# Patient Record
Sex: MALE | Race: OTHER | HISPANIC OR LATINO
[De-identification: names, ages, dates, MRNs, and addresses within clinical notes are randomized per-mention and may not be internally consistent; named-entity substitution may affect disease eponyms.]

---

## 2021-11-10 ENCOUNTER — TRANSCRIPTION ENCOUNTER (OUTPATIENT)
Age: 51
End: 2021-11-10

## 2021-11-11 ENCOUNTER — INPATIENT (INPATIENT)
Facility: HOSPITAL | Age: 51
LOS: 4 days | Discharge: HOME | DRG: 330 | End: 2021-11-16
Attending: SURGERY | Admitting: SURGERY
Payer: COMMERCIAL

## 2021-11-11 VITALS
RESPIRATION RATE: 18 BRPM | OXYGEN SATURATION: 95 % | SYSTOLIC BLOOD PRESSURE: 158 MMHG | HEART RATE: 71 BPM | TEMPERATURE: 98 F | DIASTOLIC BLOOD PRESSURE: 92 MMHG

## 2021-11-11 DIAGNOSIS — T14.90XA INJURY, UNSPECIFIED, INITIAL ENCOUNTER: ICD-10-CM

## 2021-11-11 DIAGNOSIS — K63.1 PERFORATION OF INTESTINE (NONTRAUMATIC): ICD-10-CM

## 2021-11-11 LAB
ALBUMIN SERPL ELPH-MCNC: 4.1 G/DL — SIGNIFICANT CHANGE UP (ref 3.3–5)
ALP SERPL-CCNC: 83 U/L — SIGNIFICANT CHANGE UP (ref 40–120)
ALT FLD-CCNC: 31 U/L — SIGNIFICANT CHANGE UP (ref 10–45)
ANION GAP SERPL CALC-SCNC: 14 MMOL/L — SIGNIFICANT CHANGE UP (ref 5–17)
ANION GAP SERPL CALC-SCNC: 15 MMOL/L — SIGNIFICANT CHANGE UP (ref 5–17)
APTT BLD: 23.9 SEC — LOW (ref 27.5–35.5)
APTT BLD: 24 SEC — LOW (ref 27.5–35.5)
AST SERPL-CCNC: 31 U/L — SIGNIFICANT CHANGE UP (ref 10–40)
BASOPHILS # BLD AUTO: 0.02 K/UL — SIGNIFICANT CHANGE UP (ref 0–0.2)
BASOPHILS NFR BLD AUTO: 0.2 % — SIGNIFICANT CHANGE UP (ref 0–2)
BILIRUB SERPL-MCNC: 0.7 MG/DL — SIGNIFICANT CHANGE UP (ref 0.2–1.2)
BLD GP AB SCN SERPL QL: NEGATIVE — SIGNIFICANT CHANGE UP
BUN SERPL-MCNC: 17 MG/DL — SIGNIFICANT CHANGE UP (ref 7–23)
BUN SERPL-MCNC: 19 MG/DL — SIGNIFICANT CHANGE UP (ref 7–23)
CALCIUM SERPL-MCNC: 7.7 MG/DL — LOW (ref 8.4–10.5)
CALCIUM SERPL-MCNC: 8.7 MG/DL — SIGNIFICANT CHANGE UP (ref 8.4–10.5)
CHLORIDE SERPL-SCNC: 102 MMOL/L — SIGNIFICANT CHANGE UP (ref 96–108)
CHLORIDE SERPL-SCNC: 105 MMOL/L — SIGNIFICANT CHANGE UP (ref 96–108)
CO2 SERPL-SCNC: 19 MMOL/L — LOW (ref 22–31)
CO2 SERPL-SCNC: 22 MMOL/L — SIGNIFICANT CHANGE UP (ref 22–31)
CREAT SERPL-MCNC: 0.93 MG/DL — SIGNIFICANT CHANGE UP (ref 0.5–1.3)
CREAT SERPL-MCNC: 1.15 MG/DL — SIGNIFICANT CHANGE UP (ref 0.5–1.3)
EOSINOPHIL # BLD AUTO: 0.03 K/UL — SIGNIFICANT CHANGE UP (ref 0–0.5)
EOSINOPHIL NFR BLD AUTO: 0.4 % — SIGNIFICANT CHANGE UP (ref 0–6)
GAS PNL BLDA: SIGNIFICANT CHANGE UP
GLUCOSE SERPL-MCNC: 153 MG/DL — HIGH (ref 70–99)
GLUCOSE SERPL-MCNC: 183 MG/DL — HIGH (ref 70–99)
HCT VFR BLD CALC: 42 % — SIGNIFICANT CHANGE UP (ref 39–50)
HCT VFR BLD CALC: 42.5 % — SIGNIFICANT CHANGE UP (ref 39–50)
HCT VFR BLD CALC: 43.3 % — SIGNIFICANT CHANGE UP (ref 39–50)
HGB BLD-MCNC: 13.9 G/DL — SIGNIFICANT CHANGE UP (ref 13–17)
HGB BLD-MCNC: 14.2 G/DL — SIGNIFICANT CHANGE UP (ref 13–17)
HGB BLD-MCNC: 14.3 G/DL — SIGNIFICANT CHANGE UP (ref 13–17)
IMM GRANULOCYTES NFR BLD AUTO: 0.4 % — SIGNIFICANT CHANGE UP (ref 0–1.5)
INR BLD: 0.97 RATIO — SIGNIFICANT CHANGE UP (ref 0.88–1.16)
INR BLD: 1.01 RATIO — SIGNIFICANT CHANGE UP (ref 0.88–1.16)
LIDOCAIN IGE QN: 41 U/L — SIGNIFICANT CHANGE UP (ref 7–60)
LYMPHOCYTES # BLD AUTO: 2.41 K/UL — SIGNIFICANT CHANGE UP (ref 1–3.3)
LYMPHOCYTES # BLD AUTO: 28.8 % — SIGNIFICANT CHANGE UP (ref 13–44)
MAGNESIUM SERPL-MCNC: 2 MG/DL — SIGNIFICANT CHANGE UP (ref 1.6–2.6)
MCHC RBC-ENTMCNC: 28.3 PG — SIGNIFICANT CHANGE UP (ref 27–34)
MCHC RBC-ENTMCNC: 28.5 PG — SIGNIFICANT CHANGE UP (ref 27–34)
MCHC RBC-ENTMCNC: 29 PG — SIGNIFICANT CHANGE UP (ref 27–34)
MCHC RBC-ENTMCNC: 33 GM/DL — SIGNIFICANT CHANGE UP (ref 32–36)
MCHC RBC-ENTMCNC: 33.1 GM/DL — SIGNIFICANT CHANGE UP (ref 32–36)
MCHC RBC-ENTMCNC: 33.4 GM/DL — SIGNIFICANT CHANGE UP (ref 32–36)
MCV RBC AUTO: 85.6 FL — SIGNIFICANT CHANGE UP (ref 80–100)
MCV RBC AUTO: 86.1 FL — SIGNIFICANT CHANGE UP (ref 80–100)
MCV RBC AUTO: 86.7 FL — SIGNIFICANT CHANGE UP (ref 80–100)
MONOCYTES # BLD AUTO: 0.53 K/UL — SIGNIFICANT CHANGE UP (ref 0–0.9)
MONOCYTES NFR BLD AUTO: 6.3 % — SIGNIFICANT CHANGE UP (ref 2–14)
NEUTROPHILS # BLD AUTO: 5.36 K/UL — SIGNIFICANT CHANGE UP (ref 1.8–7.4)
NEUTROPHILS NFR BLD AUTO: 63.9 % — SIGNIFICANT CHANGE UP (ref 43–77)
NRBC # BLD: 0 /100 WBCS — SIGNIFICANT CHANGE UP (ref 0–0)
PHOSPHATE SERPL-MCNC: 3.8 MG/DL — SIGNIFICANT CHANGE UP (ref 2.5–4.5)
PLATELET # BLD AUTO: 224 K/UL — SIGNIFICANT CHANGE UP (ref 150–400)
PLATELET # BLD AUTO: 228 K/UL — SIGNIFICANT CHANGE UP (ref 150–400)
PLATELET # BLD AUTO: 239 K/UL — SIGNIFICANT CHANGE UP (ref 150–400)
POTASSIUM SERPL-MCNC: 3.2 MMOL/L — LOW (ref 3.5–5.3)
POTASSIUM SERPL-MCNC: 3.9 MMOL/L — SIGNIFICANT CHANGE UP (ref 3.5–5.3)
POTASSIUM SERPL-SCNC: 3.2 MMOL/L — LOW (ref 3.5–5.3)
POTASSIUM SERPL-SCNC: 3.9 MMOL/L — SIGNIFICANT CHANGE UP (ref 3.5–5.3)
PROT SERPL-MCNC: 6.9 G/DL — SIGNIFICANT CHANGE UP (ref 6–8.3)
PROTHROM AB SERPL-ACNC: 11.7 SEC — SIGNIFICANT CHANGE UP (ref 10.6–13.6)
PROTHROM AB SERPL-ACNC: 12.1 SEC — SIGNIFICANT CHANGE UP (ref 10.6–13.6)
RBC # BLD: 4.88 M/UL — SIGNIFICANT CHANGE UP (ref 4.2–5.8)
RBC # BLD: 4.9 M/UL — SIGNIFICANT CHANGE UP (ref 4.2–5.8)
RBC # BLD: 5.06 M/UL — SIGNIFICANT CHANGE UP (ref 4.2–5.8)
RBC # FLD: 12.3 % — SIGNIFICANT CHANGE UP (ref 10.3–14.5)
RBC # FLD: 12.5 % — SIGNIFICANT CHANGE UP (ref 10.3–14.5)
RBC # FLD: 12.6 % — SIGNIFICANT CHANGE UP (ref 10.3–14.5)
RH IG SCN BLD-IMP: POSITIVE — SIGNIFICANT CHANGE UP
SARS-COV-2 RNA SPEC QL NAA+PROBE: SIGNIFICANT CHANGE UP
SARS-COV-2 RNA SPEC QL NAA+PROBE: SIGNIFICANT CHANGE UP
SODIUM SERPL-SCNC: 138 MMOL/L — SIGNIFICANT CHANGE UP (ref 135–145)
SODIUM SERPL-SCNC: 139 MMOL/L — SIGNIFICANT CHANGE UP (ref 135–145)
WBC # BLD: 2.54 K/UL — LOW (ref 3.8–10.5)
WBC # BLD: 2.97 K/UL — LOW (ref 3.8–10.5)
WBC # BLD: 8.38 K/UL — SIGNIFICANT CHANGE UP (ref 3.8–10.5)
WBC # FLD AUTO: 2.54 K/UL — LOW (ref 3.8–10.5)
WBC # FLD AUTO: 2.97 K/UL — LOW (ref 3.8–10.5)
WBC # FLD AUTO: 8.38 K/UL — SIGNIFICANT CHANGE UP (ref 3.8–10.5)

## 2021-11-11 PROCEDURE — 99291 CRITICAL CARE FIRST HOUR: CPT

## 2021-11-11 PROCEDURE — 74177 CT ABD & PELVIS W/CONTRAST: CPT | Mod: 26,MA

## 2021-11-11 PROCEDURE — 73590 X-RAY EXAM OF LOWER LEG: CPT | Mod: 26,RT

## 2021-11-11 PROCEDURE — 44605 REPAIR OF BOWEL LESION: CPT

## 2021-11-11 PROCEDURE — 72170 X-RAY EXAM OF PELVIS: CPT | Mod: 26

## 2021-11-11 PROCEDURE — 71260 CT THORAX DX C+: CPT | Mod: 26,MA

## 2021-11-11 PROCEDURE — 44603 SUTURE SMALL INTESTINE: CPT

## 2021-11-11 PROCEDURE — 71045 X-RAY EXAM CHEST 1 VIEW: CPT | Mod: 26

## 2021-11-11 RX ORDER — CALCIUM GLUCONATE 100 MG/ML
1 VIAL (ML) INTRAVENOUS ONCE
Refills: 0 | Status: COMPLETED | OUTPATIENT
Start: 2021-11-11 | End: 2021-11-11

## 2021-11-11 RX ORDER — HYDROMORPHONE HYDROCHLORIDE 2 MG/ML
0.5 INJECTION INTRAMUSCULAR; INTRAVENOUS; SUBCUTANEOUS
Refills: 0 | Status: DISCONTINUED | OUTPATIENT
Start: 2021-11-11 | End: 2021-11-12

## 2021-11-11 RX ORDER — ACETAMINOPHEN 500 MG
1000 TABLET ORAL EVERY 6 HOURS
Refills: 0 | Status: COMPLETED | OUTPATIENT
Start: 2021-11-11 | End: 2021-11-12

## 2021-11-11 RX ORDER — CHLORHEXIDINE GLUCONATE 213 G/1000ML
15 SOLUTION TOPICAL EVERY 12 HOURS
Refills: 0 | Status: DISCONTINUED | OUTPATIENT
Start: 2021-11-11 | End: 2021-11-12

## 2021-11-11 RX ORDER — MORPHINE SULFATE 50 MG/1
6 CAPSULE, EXTENDED RELEASE ORAL ONCE
Refills: 0 | Status: DISCONTINUED | OUTPATIENT
Start: 2021-11-11 | End: 2021-11-11

## 2021-11-11 RX ORDER — SODIUM CHLORIDE 9 MG/ML
1000 INJECTION, SOLUTION INTRAVENOUS
Refills: 0 | Status: DISCONTINUED | OUTPATIENT
Start: 2021-11-11 | End: 2021-11-11

## 2021-11-11 RX ORDER — ONDANSETRON 8 MG/1
8 TABLET, FILM COATED ORAL ONCE
Refills: 0 | Status: COMPLETED | OUTPATIENT
Start: 2021-11-11 | End: 2021-11-11

## 2021-11-11 RX ORDER — SODIUM CHLORIDE 9 MG/ML
1000 INJECTION, SOLUTION INTRAVENOUS
Refills: 0 | Status: DISCONTINUED | OUTPATIENT
Start: 2021-11-11 | End: 2021-11-12

## 2021-11-11 RX ORDER — TETANUS TOXOID, REDUCED DIPHTHERIA TOXOID AND ACELLULAR PERTUSSIS VACCINE, ADSORBED 5; 2.5; 8; 8; 2.5 [IU]/.5ML; [IU]/.5ML; UG/.5ML; UG/.5ML; UG/.5ML
0.5 SUSPENSION INTRAMUSCULAR ONCE
Refills: 0 | Status: COMPLETED | OUTPATIENT
Start: 2021-11-11 | End: 2021-11-11

## 2021-11-11 RX ORDER — HYDROMORPHONE HYDROCHLORIDE 2 MG/ML
0.5 INJECTION INTRAMUSCULAR; INTRAVENOUS; SUBCUTANEOUS ONCE
Refills: 0 | Status: DISCONTINUED | OUTPATIENT
Start: 2021-11-11 | End: 2021-11-11

## 2021-11-11 RX ADMIN — HYDROMORPHONE HYDROCHLORIDE 0.5 MILLIGRAM(S): 2 INJECTION INTRAMUSCULAR; INTRAVENOUS; SUBCUTANEOUS at 18:00

## 2021-11-11 RX ADMIN — HYDROMORPHONE HYDROCHLORIDE 0.5 MILLIGRAM(S): 2 INJECTION INTRAMUSCULAR; INTRAVENOUS; SUBCUTANEOUS at 18:45

## 2021-11-11 RX ADMIN — MORPHINE SULFATE 6 MILLIGRAM(S): 50 CAPSULE, EXTENDED RELEASE ORAL at 13:38

## 2021-11-11 RX ADMIN — Medication 100 GRAM(S): at 19:24

## 2021-11-11 RX ADMIN — ONDANSETRON 8 MILLIGRAM(S): 8 TABLET, FILM COATED ORAL at 13:38

## 2021-11-11 RX ADMIN — Medication 400 MILLIGRAM(S): at 22:00

## 2021-11-11 RX ADMIN — Medication 100 GRAM(S): at 18:55

## 2021-11-11 RX ADMIN — SODIUM CHLORIDE 100 MILLILITER(S): 9 INJECTION, SOLUTION INTRAVENOUS at 18:48

## 2021-11-11 RX ADMIN — Medication 1000 MILLIGRAM(S): at 22:15

## 2021-11-11 RX ADMIN — HYDROMORPHONE HYDROCHLORIDE 0.5 MILLIGRAM(S): 2 INJECTION INTRAMUSCULAR; INTRAVENOUS; SUBCUTANEOUS at 18:15
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
## 2021-11-11 NOTE — CHART NOTE - NSCHARTNOTEFT_GEN_A_CORE
Surgery Post op Note    Procedure: Exploratory laparotomy, Repair of enterotomy,Repair of colotomy     SUBJECTIVE: Pt seen and examined at bedside several hours after surgery.   SOB:  [ ] YES [ *] NO  Chest Discomfort: [ ] YES [* ] NO    Nausea: [ ] YES [ *] NO           Vomiting: [ ] YES [ *] NO  Flatus: [ ] YES [* ] NO             Bowel Movement: [ ] YES [ *] NO  Void: butterfield        Pain Control Adequate: [* ] YES [ ] NO      Physical exam  General Appearance: Appears well, NAD  Respiratory: No labored breathing  CV: Pulse regularly present  Abdomen: Soft, distended, nontender, dressing clear    Vital Signs Last 24 Hrs  T(C): 37 (11 Nov 2021 20:00), Max: 37 (11 Nov 2021 20:00)  T(F): 98.6 (11 Nov 2021 20:00), Max: 98.6 (11 Nov 2021 20:00)  HR: 91 (11 Nov 2021 22:15) (71 - 97)  BP: 130/83 (11 Nov 2021 22:00) (130/83 - 161/82)  BP(mean): 101 (11 Nov 2021 22:00) (98 - 114)  RR: 16 (11 Nov 2021 22:15) (14 - 24)  SpO2: 95% (11 Nov 2021 22:15) (94% - 100%)  I&O's Summary    11 Nov 2021 07:01  -  11 Nov 2021 23:04  --------------------------------------------------------  IN: 300 mL / OUT: 525 mL / NET: -225 mL      I&O's Detail    11 Nov 2021 07:01  -  11 Nov 2021 23:04  --------------------------------------------------------  IN:    IV PiggyBack: 100 mL    Lactated Ringers: 200 mL  Total IN: 300 mL    OUT:    Indwelling Catheter - Urethral (mL): 125 mL    Nasogastric/Oral tube (mL): 400 mL  Total OUT: 525 mL    Total NET: -225 mL          MEDICATIONS  (STANDING):  acetaminophen   IVPB .. 1000 milliGRAM(s) IV Intermittent every 6 hours  chlorhexidine 0.12% Liquid 15 milliLiter(s) Oral Mucosa every 12 hours  diphtheria/tetanus/pertussis (acellular) Vaccine (ADAcel) 0.5 milliLiter(s) IntraMuscular once  lactated ringers. 1000 milliLiter(s) (100 mL/Hr) IV Continuous <Continuous>    MEDICATIONS  (PRN):  HYDROmorphone  Injectable 0.5 milliGRAM(s) IV Push every 10 minutes PRN Moderate Pain (4 - 6)      LABS:                        14.3   2.97  )-----------( 224      ( 11 Nov 2021 22:05 )             43.3     11-11    139  |  105  |  17  ----------------------------<  183<H>  3.9   |  19<L>  |  0.93    Ca    7.7<L>      11 Nov 2021 18:40  Phos  3.8     11-11  Mg     2.0     11-11    TPro  6.9  /  Alb  4.1  /  TBili  0.7  /  DBili  x   /  AST  31  /  ALT  31  /  AlkPhos  83  11-11    PT/INR - ( 11 Nov 2021 18:40 )   PT: 12.1 sec;   INR: 1.01 ratio         PTT - ( 11 Nov 2021 18:40 )  PTT:24.0 sec        Plan:  51 M s/p Exploratory laparotomy, Repair of enterotomy, Repair of colotomy.  - NG tube, NPO, IVF  - pain control  - serial abdominal examination  - f/u vitals  - f/u am labs    ACS  [id number]

## 2021-11-11 NOTE — ED PROVIDER NOTE - PHYSICAL EXAMINATION
gen: appears uncomfortable  Mentation: AAO x 3  psych: mood appropriate  ENT: airway patent  Eyes: conjunctivae clear bilaterally  Cardio: RRR, no m/r/g  Resp: normal BS b/l  GI: distended, diffusely tender  Neuro: sensation and motor function intact  Skin: +4 cm superficial laceration of R ant shin  MSK: normal movement of all extremities, no gross bony deformities, no spinal tenderness  Lymph/Vasc: no LE edema

## 2021-11-11 NOTE — BRIEF OPERATIVE NOTE - NSICDXBRIEFPROCEDURE_GEN_ALL_CORE_FT
PROCEDURES:  Exploratory laparotomy 11-Nov-2021 18:06:46  Clif Galvez  Repair of enterotomy 11-Nov-2021 18:06:55  Clif Galvez  Repair of colotomy 11-Nov-2021 18:07:14  Clif Galvez

## 2021-11-11 NOTE — H&P ADULT - NSHPLABSRESULTS_GEN_ALL_CORE
Vital Signs Last 24 Hrs  T(C): 36.7 (11 Nov 2021 13:21), Max: 36.7 (11 Nov 2021 13:21)  T(F): 98 (11 Nov 2021 13:21), Max: 98 (11 Nov 2021 13:21)  HR: 88 (11 Nov 2021 13:30) (71 - 88)  BP: 157/76 (11 Nov 2021 13:30) (157/76 - 158/92)  BP(mean): --  RR: 24 (11 Nov 2021 13:30) (18 - 24)  SpO2: 96% (11 Nov 2021 13:30) (95% - 96%)    LABS:                          13.9   8.38  )-----------( 228      ( 11 Nov 2021 13:58 )             42.0     11-11    138  |  102  |  19  ----------------------------<  153<H>  3.2<L>   |  22  |  1.15    Ca    8.7      11 Nov 2021 13:58    TPro  6.9  /  Alb  4.1  /  TBili  0.7  /  DBili  x   /  AST  31  /  ALT  31  /  AlkPhos  83  11-11    PT/INR - ( 11 Nov 2021 13:58 )   PT: 11.7 sec;   INR: 0.97 ratio         PTT - ( 11 Nov 2021 13:58 )  PTT:23.9 sec      < from: CT Chest w/ IV Cont (11.11.21 @ 13:51) >      EXAM:  CT ABDOMEN AND PELVIS IC                          EXAM:  CT CHEST IC                            PROCEDURE DATE:  11/11/2021            INTERPRETATION:  CLINICAL INFORMATION: Trauma code.    COMPARISON: None.    CONTRAST/COMPLICATIONS:  IV Contrast: Omnipaque 350  90 cc administered   10 cc discarded  Oral Contrast: NONE  Complications: None reported at time of study completion    PROCEDURE:  CT of the Chest, Abdomen and Pelvis was performed.  Sagittal and coronal reformats were performed.    IMPRESSION:  Mild pneumoperitoneum and hemoperitoneum. Focal wall thickening of the transverse colon with adjacent extraluminal air raising suspicion for bowel injury/perforation at this site. Right lower quadrant mesenteric hematoma with active bleeding adjacent to the proximal ascending colon.    < from: Xray Tibia + Fibula 2 Views, Right (11.11.21 @ 14:22) >    IMPRESSION:    There is cortical irregularity along the lateral tibial plateau, which may represent a lateral tibial plateau fracture. Recommend further evaluation with right knee radiographs.    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    --- End of Report ---    < end of copied text >

## 2021-11-11 NOTE — PRE-ANESTHESIA EVALUATION ADULT - NSANTHADDINFOFT_GEN_ALL_CORE
r+b discussed with patient; consent implied, patient was first seen in OR2 brought emergently from ED. 2 physician consent  plan for gurinder rain aline

## 2021-11-11 NOTE — ED ADULT NURSE NOTE - NSIMPLEMENTINTERV_GEN_ALL_ED
Implemented All Fall Risk Interventions:  [place] to call system. Call bell, personal items and telephone within reach. Instruct patient to call for assistance. Room bathroom lighting operational. Non-slip footwear when patient is off stretcher. Physically safe environment: no spills, clutter or unnecessary equipment. Stretcher in lowest position, wheels locked, appropriate side rails in place. Provide visual cue, wrist band, yellow gown, etc. Monitor gait and stability. Monitor for mental status changes and reorient to person, place, and time. Review medications for side effects contributing to fall risk. Reinforce activity limits and safety measures with patient and family.
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
## 2021-11-11 NOTE — H&P ADULT - NSHPPHYSICALEXAM_GEN_ALL_CORE
PHYSICAL EXAM:    Constitutional: NAD, lying in bed  Eyes: anicteric  ENMT: Normocephalic, atraumatic  Neck: trachea midline, no TTP, no stepoffs, moves without pain  Respiratory: nonlabored, equal chest rise  Cardiovascular: RRR  Gastrointestinal: abdomen significantly distended, firm to palpation, TTP in all four quadrants, greatest in RUQ  Extremities: RLE with abrasion and edema over right shin, remaining extremities WWP, SILT grossly intact, no gross deformities  Vascular: 2+ DP/PT, b/l radial pulses  Back: no TTP, no injuries, no stepoffs along spine  Neurological: awake and alert, moves all 4 extremities spontaneously
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
## 2021-11-11 NOTE — AIRWAY REMOVAL NOTE  ADULT & PEDS - ARTIFICAL AIRWAY REMOVAL COMMENTS
@ 1745 Pt had 1 twitch on TOF. Given 400mg of suggamadex , @ 1750 patient with 4 twitches on TOF. Propofol discontinued@ 1750. Pateint placed on CPAP 5 PS 5 40% @ 1800. @ 1805 Pt's RR 16 Pulling tidal volumes of 450-900. Pt followuing commands appraopratley with ,. Patient afirmed he was in pain and received 0.5 mg IV of Dilaudid. Dr Rodriguez at bedside. Patient extubated after oropharynx suctioned out without incident. Post extubation abg sent

## 2021-11-11 NOTE — ED ADULT TRIAGE NOTE - PRO INTERPRETER NEED 2
What Type Of Note Output Would You Prefer (Optional)?: Standard Output
How Severe Is Your Rash?: mild
Is This A New Presentation, Or A Follow-Up?: Rash
English

## 2021-11-11 NOTE — BRIEF OPERATIVE NOTE - OPERATION/FINDINGS
Exploratory laparotomy. Mix of blood & succus on entrance to abdomen. No significant bleeding noted after suctioning. Abdomen packed. Small bowel run with identification of 2 separate enterotomies, each about 5mm in diameter - repaired primarily in 2 layers. Colon examined and 2cm full thickness injury to transverse colon identified - repaired primarily in 2 layers. Remainder of abdomen explored - no additional injuries identified. Abdomen washed out and fascia closed with #1 maxon running. Skin stapled with eduardo.

## 2021-11-11 NOTE — ED PROVIDER NOTE - NS ED ROS FT
CONSTITUTIONAL: No fevers, no chills, no lightheadedness, no dizziness  Eyes: no visual changes  Ears: no ear drainage, no ear pain  Nose: no nasal congestion  Mouth/Throat: no sore throat  CV: No chest pain, no palpitations  PULM: No SOB, no cough  GI: +n/v, abd pain  : no dysuria, no hematuria  SKIN: +abrasion of RLE  NEURO: no headache, no focal weakness or numbness  LYMPH/VASC: no LE swelling

## 2021-11-12 LAB
ANION GAP SERPL CALC-SCNC: 14 MMOL/L — SIGNIFICANT CHANGE UP (ref 5–17)
BUN SERPL-MCNC: 23 MG/DL — SIGNIFICANT CHANGE UP (ref 7–23)
CALCIUM SERPL-MCNC: 8.5 MG/DL — SIGNIFICANT CHANGE UP (ref 8.4–10.5)
CHLORIDE SERPL-SCNC: 102 MMOL/L — SIGNIFICANT CHANGE UP (ref 96–108)
CO2 SERPL-SCNC: 20 MMOL/L — LOW (ref 22–31)
COVID-19 NUCLEOCAPSID GAM AB INTERP: NEGATIVE — SIGNIFICANT CHANGE UP
COVID-19 NUCLEOCAPSID TOTAL GAM ANTIBODY RESULT: 0.07 INDEX — SIGNIFICANT CHANGE UP
COVID-19 SPIKE DOMAIN AB INTERP: POSITIVE
COVID-19 SPIKE DOMAIN ANTIBODY RESULT: >250 U/ML — HIGH
CREAT SERPL-MCNC: 1.25 MG/DL — SIGNIFICANT CHANGE UP (ref 0.5–1.3)
GLUCOSE SERPL-MCNC: 163 MG/DL — HIGH (ref 70–99)
HCT VFR BLD CALC: 41.8 % — SIGNIFICANT CHANGE UP (ref 39–50)
HGB BLD-MCNC: 13.9 G/DL — SIGNIFICANT CHANGE UP (ref 13–17)
MAGNESIUM SERPL-MCNC: 1.8 MG/DL — SIGNIFICANT CHANGE UP (ref 1.6–2.6)
MCHC RBC-ENTMCNC: 28.8 PG — SIGNIFICANT CHANGE UP (ref 27–34)
MCHC RBC-ENTMCNC: 33.3 GM/DL — SIGNIFICANT CHANGE UP (ref 32–36)
MCV RBC AUTO: 86.7 FL — SIGNIFICANT CHANGE UP (ref 80–100)
NRBC # BLD: 0 /100 WBCS — SIGNIFICANT CHANGE UP (ref 0–0)
PHOSPHATE SERPL-MCNC: 3.4 MG/DL — SIGNIFICANT CHANGE UP (ref 2.5–4.5)
PLATELET # BLD AUTO: 211 K/UL — SIGNIFICANT CHANGE UP (ref 150–400)
POTASSIUM SERPL-MCNC: 4.1 MMOL/L — SIGNIFICANT CHANGE UP (ref 3.5–5.3)
POTASSIUM SERPL-SCNC: 4.1 MMOL/L — SIGNIFICANT CHANGE UP (ref 3.5–5.3)
RBC # BLD: 4.82 M/UL — SIGNIFICANT CHANGE UP (ref 4.2–5.8)
RBC # FLD: 12.8 % — SIGNIFICANT CHANGE UP (ref 10.3–14.5)
SARS-COV-2 IGG+IGM SERPL QL IA: 0.07 INDEX — SIGNIFICANT CHANGE UP
SARS-COV-2 IGG+IGM SERPL QL IA: >250 U/ML — HIGH
SARS-COV-2 IGG+IGM SERPL QL IA: NEGATIVE — SIGNIFICANT CHANGE UP
SARS-COV-2 IGG+IGM SERPL QL IA: POSITIVE
SODIUM SERPL-SCNC: 136 MMOL/L — SIGNIFICANT CHANGE UP (ref 135–145)
WBC # BLD: 5.76 K/UL — SIGNIFICANT CHANGE UP (ref 3.8–10.5)
WBC # FLD AUTO: 5.76 K/UL — SIGNIFICANT CHANGE UP (ref 3.8–10.5)

## 2021-11-12 RX ORDER — ENOXAPARIN SODIUM 100 MG/ML
40 INJECTION SUBCUTANEOUS EVERY 24 HOURS
Refills: 0 | Status: DISCONTINUED | OUTPATIENT
Start: 2021-11-12 | End: 2021-11-16

## 2021-11-12 RX ORDER — MAGNESIUM SULFATE 500 MG/ML
2 VIAL (ML) INJECTION ONCE
Refills: 0 | Status: COMPLETED | OUTPATIENT
Start: 2021-11-12 | End: 2021-11-12

## 2021-11-12 RX ORDER — INFLUENZA VIRUS VACCINE 15; 15; 15; 15 UG/.5ML; UG/.5ML; UG/.5ML; UG/.5ML
0.5 SUSPENSION INTRAMUSCULAR ONCE
Refills: 0 | Status: DISCONTINUED | OUTPATIENT
Start: 2021-11-12 | End: 2021-11-16

## 2021-11-12 RX ORDER — ACETAMINOPHEN 500 MG
1000 TABLET ORAL EVERY 6 HOURS
Refills: 0 | Status: COMPLETED | OUTPATIENT
Start: 2021-11-12 | End: 2021-11-13

## 2021-11-12 RX ORDER — HYDROMORPHONE HYDROCHLORIDE 2 MG/ML
0.5 INJECTION INTRAMUSCULAR; INTRAVENOUS; SUBCUTANEOUS EVERY 4 HOURS
Refills: 0 | Status: DISCONTINUED | OUTPATIENT
Start: 2021-11-12 | End: 2021-11-14

## 2021-11-12 RX ORDER — KETOROLAC TROMETHAMINE 30 MG/ML
15 SYRINGE (ML) INJECTION EVERY 8 HOURS
Refills: 0 | Status: DISCONTINUED | OUTPATIENT
Start: 2021-11-12 | End: 2021-11-14

## 2021-11-12 RX ORDER — DEXTROSE MONOHYDRATE, SODIUM CHLORIDE, AND POTASSIUM CHLORIDE 50; .745; 4.5 G/1000ML; G/1000ML; G/1000ML
1000 INJECTION, SOLUTION INTRAVENOUS
Refills: 0 | Status: DISCONTINUED | OUTPATIENT
Start: 2021-11-12 | End: 2021-11-15

## 2021-11-12 RX ADMIN — Medication 400 MILLIGRAM(S): at 20:30

## 2021-11-12 RX ADMIN — Medication 1000 MILLIGRAM(S): at 11:00

## 2021-11-12 RX ADMIN — Medication 1000 MILLIGRAM(S): at 21:00

## 2021-11-12 RX ADMIN — Medication 50 GRAM(S): at 10:34

## 2021-11-12 RX ADMIN — Medication 400 MILLIGRAM(S): at 10:33

## 2021-11-12 RX ADMIN — Medication 1000 MILLIGRAM(S): at 04:40

## 2021-11-12 RX ADMIN — ENOXAPARIN SODIUM 40 MILLIGRAM(S): 100 INJECTION SUBCUTANEOUS at 10:35

## 2021-11-12 RX ADMIN — Medication 400 MILLIGRAM(S): at 04:58

## 2021-11-12 RX ADMIN — SODIUM CHLORIDE 100 MILLILITER(S): 9 INJECTION, SOLUTION INTRAVENOUS at 04:58

## 2021-11-12 NOTE — PROGRESS NOTE ADULT - SUBJECTIVE AND OBJECTIVE BOX
Surgery Progress Note    INTERVAl/SUBJECTIVE: No acute event overnight.     Vital Signs Last 24 Hrs  T(C): 36.8 (12 Nov 2021 01:11), Max: 37.7 (12 Nov 2021 00:10)  T(F): 98.3 (12 Nov 2021 01:11), Max: 99.9 (12 Nov 2021 00:10)  HR: 77 (12 Nov 2021 01:11) (71 - 97)  BP: 122/80 (12 Nov 2021 01:11) (122/80 - 161/82)  BP(mean): 103 (11 Nov 2021 23:40) (98 - 114)  RR: 18 (12 Nov 2021 01:11) (14 - 24)  SpO2: 94% (12 Nov 2021 01:11) (94% - 100%)    Physical Exam:  General:  Neuro:    CV:   Abdomen:     LABS:                        14.3   2.97  )-----------( 224      ( 11 Nov 2021 22:05 )             43.3     11-11    139  |  105  |  17  ----------------------------<  183<H>  3.9   |  19<L>  |  0.93    Ca    7.7<L>      11 Nov 2021 18:40  Phos  3.8     11-11  Mg     2.0     11-11    TPro  6.9  /  Alb  4.1  /  TBili  0.7  /  DBili  x   /  AST  31  /  ALT  31  /  AlkPhos  83  11-11    PT/INR - ( 11 Nov 2021 18:40 )   PT: 12.1 sec;   INR: 1.01 ratio         PTT - ( 11 Nov 2021 18:40 )  PTT:24.0 sec      INs and OUTs:    11-11-21 @ 07:01  -  11-12-21 @ 03:51  --------------------------------------------------------  IN: 750 mL / OUT: 750 mL / NET: 0 mL     SURGERY PROGRESS NOTE  Hospital Day #11-11-21 (1d)  Procedure/Dx: Exploratory laparotomy. Repair of enterotomy. Repair of colotomy    Pt seen and examined at bedside.  No complaints.  Pain controlled.  Denies N/V.  Tolerating diet.  Passing flatus and BM.  No acute events overnight.    Vital Signs Last 24 Hrs  T(C): 36.8 (12 Nov 2021 01:11), Max: 37.7 (12 Nov 2021 00:10)  T(F): 98.3 (12 Nov 2021 01:11), Max: 99.9 (12 Nov 2021 00:10)  HR: 77 (12 Nov 2021 01:11) (71 - 97)  BP: 122/80 (12 Nov 2021 01:11) (122/80 - 161/82)  BP(mean): 103 (11 Nov 2021 23:40) (98 - 114)  RR: 18 (12 Nov 2021 01:11) (14 - 24)  SpO2: 94% (12 Nov 2021 01:11) (94% - 100%)    PHYSICAL EXAM         I's & O's  11-11-21 @ 07:01  -  11-12-21 @ 04:04  --------------------------------------------------------  Total NET: 0 mL                      14.3   2.97  )-----------( 224      ( 11 Nov 2021 22:05 )             43.3   139  |  105  |  17  ----------------------------<  183<H>  3.9   |  19<L>  |  0.93  Ca    7.7<L>      11 Nov 2021 18:40  Phos  3.8     11-11  Mg     2.0     11-11  TPro  6.9  /  Alb  4.1  /  TBili  0.7  /  DBili  x   /  AST  31  /  ALT  31  /  AlkPhos  83  11-11  PT/INR - ( 11 Nov 2021 18:40 )   PT: 12.1 sec;   INR: 1.01 ratio     PTT - ( 11 Nov 2021 18:40 )  PTT:24.0 sec  LIVER FUNCTIONS - ( 11 Nov 2021 13:58 )  Alb: 4.1 g/dL / Pro: 6.9 g/dL / ALK PHOS: 83 U/L / ALT: 31 U/L / AST: 31 U/L / GGT: x         ABG - ( 11 Nov 2021 18:37 ) pH, Arterial: 7.39  pH, Blood: x     /  pCO2: 36    /  pO2: 153   / HCO3: 22    / Base Excess: -2.6  /  SaO2: 99.1

## 2021-11-12 NOTE — PATIENT PROFILE ADULT - LANGUAGE ASSISTANCE NEEDED
pt able to communicate in english/No-Patient/Caregiver offered and refused free interpretation services.
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
## 2021-11-12 NOTE — PATIENT PROFILE ADULT - INTERPRETATION SERVICES DECLINED
Patient Instructions by Macie Marquez [address] [place] [address] at 06/28/18 03:43 PM     Author:  Macie Marquez MA Service:  (none) Author Type:  Medical Assistant     Filed:  06/28/18 03:44 PM Encounter Date:  6/28/2018 Status:  Signed     :  Macie Maqruez MA (Medical Assistant)            Fasting Future Labs    Please come prior to your next appointment to recheck fasting labs. Â· Please come fasting (at least 8 hours) to check labs. Â· Please drink plenty of water before your appointment. Â· You can take any prescribed or routine medicine with water before your appointment. Â· You can brush your teeth even if you are fasting. Phone Number: If you have any questions, please call our office at [phone]    Additional Educational Resources: For additional resources regarding your symptoms, diagnosis, or further health information, please visit the Health Resources section on [email or web address] or the Online Health Resources section in [email or web address].     Next visit with Allie Koyanagi is on 12/07/2018 at 11:40 AM in [address] [place] [address]        Revision History        User Key Date/Time User Provider Type Action    > [N/A] 06/28/18 03:44 PM Macie Marquez [facility] Medical Assistant Sign pt able to understand and speak english

## 2021-11-13 LAB
ANION GAP SERPL CALC-SCNC: 13 MMOL/L — SIGNIFICANT CHANGE UP (ref 5–17)
BUN SERPL-MCNC: 30 MG/DL — HIGH (ref 7–23)
CALCIUM SERPL-MCNC: 8.4 MG/DL — SIGNIFICANT CHANGE UP (ref 8.4–10.5)
CHLORIDE SERPL-SCNC: 102 MMOL/L — SIGNIFICANT CHANGE UP (ref 96–108)
CO2 SERPL-SCNC: 23 MMOL/L — SIGNIFICANT CHANGE UP (ref 22–31)
CREAT SERPL-MCNC: 1.09 MG/DL — SIGNIFICANT CHANGE UP (ref 0.5–1.3)
GLUCOSE SERPL-MCNC: 123 MG/DL — HIGH (ref 70–99)
HCT VFR BLD CALC: 36.9 % — LOW (ref 39–50)
HGB BLD-MCNC: 12.2 G/DL — LOW (ref 13–17)
MAGNESIUM SERPL-MCNC: 2.1 MG/DL — SIGNIFICANT CHANGE UP (ref 1.6–2.6)
MCHC RBC-ENTMCNC: 28.6 PG — SIGNIFICANT CHANGE UP (ref 27–34)
MCHC RBC-ENTMCNC: 33.1 GM/DL — SIGNIFICANT CHANGE UP (ref 32–36)
MCV RBC AUTO: 86.6 FL — SIGNIFICANT CHANGE UP (ref 80–100)
NRBC # BLD: 0 /100 WBCS — SIGNIFICANT CHANGE UP (ref 0–0)
PHOSPHATE SERPL-MCNC: 2 MG/DL — LOW (ref 2.5–4.5)
PLATELET # BLD AUTO: 168 K/UL — SIGNIFICANT CHANGE UP (ref 150–400)
POTASSIUM SERPL-MCNC: 3.7 MMOL/L — SIGNIFICANT CHANGE UP (ref 3.5–5.3)
POTASSIUM SERPL-SCNC: 3.7 MMOL/L — SIGNIFICANT CHANGE UP (ref 3.5–5.3)
RBC # BLD: 4.26 M/UL — SIGNIFICANT CHANGE UP (ref 4.2–5.8)
RBC # FLD: 13.2 % — SIGNIFICANT CHANGE UP (ref 10.3–14.5)
SODIUM SERPL-SCNC: 138 MMOL/L — SIGNIFICANT CHANGE UP (ref 135–145)
WBC # BLD: 7.1 K/UL — SIGNIFICANT CHANGE UP (ref 3.8–10.5)
WBC # FLD AUTO: 7.1 K/UL — SIGNIFICANT CHANGE UP (ref 3.8–10.5)

## 2021-11-13 RX ORDER — PIPERACILLIN AND TAZOBACTAM 4; .5 G/20ML; G/20ML
3.38 INJECTION, POWDER, LYOPHILIZED, FOR SOLUTION INTRAVENOUS ONCE
Refills: 0 | Status: COMPLETED | OUTPATIENT
Start: 2021-11-13 | End: 2021-11-13

## 2021-11-13 RX ORDER — POTASSIUM PHOSPHATE, MONOBASIC POTASSIUM PHOSPHATE, DIBASIC 236; 224 MG/ML; MG/ML
15 INJECTION, SOLUTION INTRAVENOUS ONCE
Refills: 0 | Status: COMPLETED | OUTPATIENT
Start: 2021-11-13 | End: 2021-11-13

## 2021-11-13 RX ORDER — POTASSIUM CHLORIDE 20 MEQ
10 PACKET (EA) ORAL ONCE
Refills: 0 | Status: COMPLETED | OUTPATIENT
Start: 2021-11-13 | End: 2021-11-13

## 2021-11-13 RX ORDER — ACETAMINOPHEN 500 MG
1000 TABLET ORAL EVERY 8 HOURS
Refills: 0 | Status: DISCONTINUED | OUTPATIENT
Start: 2021-11-14 | End: 2021-11-14

## 2021-11-13 RX ORDER — PIPERACILLIN AND TAZOBACTAM 4; .5 G/20ML; G/20ML
3.38 INJECTION, POWDER, LYOPHILIZED, FOR SOLUTION INTRAVENOUS EVERY 8 HOURS
Refills: 0 | Status: DISCONTINUED | OUTPATIENT
Start: 2021-11-13 | End: 2021-11-14

## 2021-11-13 RX ADMIN — Medication 1000 MILLIGRAM(S): at 15:53

## 2021-11-13 RX ADMIN — Medication 100 MILLIEQUIVALENT(S): at 12:09

## 2021-11-13 RX ADMIN — Medication 1000 MILLIGRAM(S): at 10:00

## 2021-11-13 RX ADMIN — Medication 400 MILLIGRAM(S): at 14:04

## 2021-11-13 RX ADMIN — DEXTROSE MONOHYDRATE, SODIUM CHLORIDE, AND POTASSIUM CHLORIDE 100 MILLILITER(S): 50; .745; 4.5 INJECTION, SOLUTION INTRAVENOUS at 22:29

## 2021-11-13 RX ADMIN — PIPERACILLIN AND TAZOBACTAM 200 GRAM(S): 4; .5 INJECTION, POWDER, LYOPHILIZED, FOR SOLUTION INTRAVENOUS at 07:23

## 2021-11-13 RX ADMIN — ENOXAPARIN SODIUM 40 MILLIGRAM(S): 100 INJECTION SUBCUTANEOUS at 09:08

## 2021-11-13 RX ADMIN — PIPERACILLIN AND TAZOBACTAM 25 GRAM(S): 4; .5 INJECTION, POWDER, LYOPHILIZED, FOR SOLUTION INTRAVENOUS at 12:05

## 2021-11-13 RX ADMIN — Medication 400 MILLIGRAM(S): at 09:06

## 2021-11-13 RX ADMIN — POTASSIUM PHOSPHATE, MONOBASIC POTASSIUM PHOSPHATE, DIBASIC 62.5 MILLIMOLE(S): 236; 224 INJECTION, SOLUTION INTRAVENOUS at 12:09

## 2021-11-13 RX ADMIN — Medication 400 MILLIGRAM(S): at 03:20

## 2021-11-13 RX ADMIN — Medication 1000 MILLIGRAM(S): at 04:00

## 2021-11-13 RX ADMIN — PIPERACILLIN AND TAZOBACTAM 25 GRAM(S): 4; .5 INJECTION, POWDER, LYOPHILIZED, FOR SOLUTION INTRAVENOUS at 18:06

## 2021-11-13 RX ADMIN — DEXTROSE MONOHYDRATE, SODIUM CHLORIDE, AND POTASSIUM CHLORIDE 100 MILLILITER(S): 50; .745; 4.5 INJECTION, SOLUTION INTRAVENOUS at 03:19
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
## 2021-11-13 NOTE — PROGRESS NOTE ADULT - SUBJECTIVE AND OBJECTIVE BOX
Surgery Progress Note    INTERVAl/SUBJECTIVE: No acute event overnight.     Vital Signs Last 24 Hrs  T(C): 37.8 (12 Nov 2021 21:00), Max: 37.8 (12 Nov 2021 21:00)  T(F): 100.1 (12 Nov 2021 21:00), Max: 100.1 (12 Nov 2021 21:00)  HR: 101 (12 Nov 2021 21:00) (76 - 105)  BP: 128/83 (12 Nov 2021 21:00) (122/80 - 143/70)  BP(mean): --  RR: 18 (12 Nov 2021 21:00) (18 - 18)  SpO2: 93% (12 Nov 2021 21:00) (93% - 95%)    Physical Exam:  General:  Neuro:    CV:   Abdomen:     LABS:                        13.9   5.76  )-----------( 211      ( 12 Nov 2021 06:45 )             41.8     11-12    136  |  102  |  23  ----------------------------<  163<H>  4.1   |  20<L>  |  1.25    Ca    8.5      12 Nov 2021 06:49  Phos  3.4     11-12  Mg     1.8     11-12    TPro  6.9  /  Alb  4.1  /  TBili  0.7  /  DBili  x   /  AST  31  /  ALT  31  /  AlkPhos  83  11-11    PT/INR - ( 11 Nov 2021 18:40 )   PT: 12.1 sec;   INR: 1.01 ratio         PTT - ( 11 Nov 2021 18:40 )  PTT:24.0 sec      INs and OUTs:    11-11-21 @ 07:01  -  11-12-21 @ 07:00  --------------------------------------------------------  IN: 850 mL / OUT: 960 mL / NET: -110 mL    11-12-21 @ 07:01  -  11-13-21 @ 00:41  --------------------------------------------------------  IN: 0 mL / OUT: 1550 mL / NET: -1550 mL     Surgery Progress Note    INTERVAl/SUBJECTIVE: No acute event overnight. States that he is comfortable.     Vital Signs Last 24 Hrs  T(C): 37.8 (12 Nov 2021 21:00), Max: 37.8 (12 Nov 2021 21:00)  T(F): 100.1 (12 Nov 2021 21:00), Max: 100.1 (12 Nov 2021 21:00)  HR: 101 (12 Nov 2021 21:00) (76 - 105)  BP: 128/83 (12 Nov 2021 21:00) (122/80 - 143/70)  BP(mean): --  RR: 18 (12 Nov 2021 21:00) (18 - 18)  SpO2: 93% (12 Nov 2021 21:00) (93% - 95%)    Physical Exam:  General: NAD however   Neuro:    CV:   Abdomen:     LABS:                        13.9   5.76  )-----------( 211      ( 12 Nov 2021 06:45 )             41.8     11-12    136  |  102  |  23  ----------------------------<  163<H>  4.1   |  20<L>  |  1.25    Ca    8.5      12 Nov 2021 06:49  Phos  3.4     11-12  Mg     1.8     11-12    TPro  6.9  /  Alb  4.1  /  TBili  0.7  /  DBili  x   /  AST  31  /  ALT  31  /  AlkPhos  83  11-11    PT/INR - ( 11 Nov 2021 18:40 )   PT: 12.1 sec;   INR: 1.01 ratio         PTT - ( 11 Nov 2021 18:40 )  PTT:24.0 sec      INs and OUTs:    11-11-21 @ 07:01  -  11-12-21 @ 07:00  --------------------------------------------------------  IN: 850 mL / OUT: 960 mL / NET: -110 mL    11-12-21 @ 07:01  -  11-13-21 @ 00:41  --------------------------------------------------------  IN: 0 mL / OUT: 1550 mL / NET: -1550 mL     Surgery Progress Note    INTERVAl/SUBJECTIVE: No acute event overnight. States that he is comfortable.     Vital Signs Last 24 Hrs  T(C): 37.8 (12 Nov 2021 21:00), Max: 37.8 (12 Nov 2021 21:00)  T(F): 100.1 (12 Nov 2021 21:00), Max: 100.1 (12 Nov 2021 21:00)  HR: 101 (12 Nov 2021 21:00) (76 - 105)  BP: 128/83 (12 Nov 2021 21:00) (122/80 - 143/70)  BP(mean): --  RR: 18 (12 Nov 2021 21:00) (18 - 18)  SpO2: 93% (12 Nov 2021 21:00) (93% - 95%)    Physical Exam:  General: NAD however looks uncomfortable  Neuro:  AOx3  Abdomen: Soft, distended, nt. NGT in place 1.1L 24hr.     LABS:                        13.9   5.76  )-----------( 211      ( 12 Nov 2021 06:45 )             41.8     11-12    136  |  102  |  23  ----------------------------<  163<H>  4.1   |  20<L>  |  1.25    Ca    8.5      12 Nov 2021 06:49  Phos  3.4     11-12  Mg     1.8     11-12    TPro  6.9  /  Alb  4.1  /  TBili  0.7  /  DBili  x   /  AST  31  /  ALT  31  /  AlkPhos  83  11-11    PT/INR - ( 11 Nov 2021 18:40 )   PT: 12.1 sec;   INR: 1.01 ratio         PTT - ( 11 Nov 2021 18:40 )  PTT:24.0 sec      INs and OUTs:    11-11-21 @ 07:01  -  11-12-21 @ 07:00  --------------------------------------------------------  IN: 850 mL / OUT: 960 mL / NET: -110 mL    11-12-21 @ 07:01  -  11-13-21 @ 00:41  --------------------------------------------------------  IN: 0 mL / OUT: 1550 mL / NET: -1550 mL

## 2021-11-13 NOTE — PROGRESS NOTE ADULT - ATTENDING COMMENTS
- Pending bowel function. Abdomen benign. Mild distention.  - Optimize pain management.  - Continue current medications.

## 2021-11-14 LAB
ANION GAP SERPL CALC-SCNC: 13 MMOL/L — SIGNIFICANT CHANGE UP (ref 5–17)
BUN SERPL-MCNC: 20 MG/DL — SIGNIFICANT CHANGE UP (ref 7–23)
CALCIUM SERPL-MCNC: 8.2 MG/DL — LOW (ref 8.4–10.5)
CHLORIDE SERPL-SCNC: 103 MMOL/L — SIGNIFICANT CHANGE UP (ref 96–108)
CO2 SERPL-SCNC: 20 MMOL/L — LOW (ref 22–31)
CREAT SERPL-MCNC: 0.89 MG/DL — SIGNIFICANT CHANGE UP (ref 0.5–1.3)
GLUCOSE SERPL-MCNC: 113 MG/DL — HIGH (ref 70–99)
HCT VFR BLD CALC: 36.3 % — LOW (ref 39–50)
HGB BLD-MCNC: 11.7 G/DL — LOW (ref 13–17)
MAGNESIUM SERPL-MCNC: 2.1 MG/DL — SIGNIFICANT CHANGE UP (ref 1.6–2.6)
MCHC RBC-ENTMCNC: 28.7 PG — SIGNIFICANT CHANGE UP (ref 27–34)
MCHC RBC-ENTMCNC: 32.2 GM/DL — SIGNIFICANT CHANGE UP (ref 32–36)
MCV RBC AUTO: 89.2 FL — SIGNIFICANT CHANGE UP (ref 80–100)
NRBC # BLD: 0 /100 WBCS — SIGNIFICANT CHANGE UP (ref 0–0)
PHOSPHATE SERPL-MCNC: 2.2 MG/DL — LOW (ref 2.5–4.5)
PLATELET # BLD AUTO: 170 K/UL — SIGNIFICANT CHANGE UP (ref 150–400)
POTASSIUM SERPL-MCNC: 3.6 MMOL/L — SIGNIFICANT CHANGE UP (ref 3.5–5.3)
POTASSIUM SERPL-SCNC: 3.6 MMOL/L — SIGNIFICANT CHANGE UP (ref 3.5–5.3)
RBC # BLD: 4.07 M/UL — LOW (ref 4.2–5.8)
RBC # FLD: 13.2 % — SIGNIFICANT CHANGE UP (ref 10.3–14.5)
SODIUM SERPL-SCNC: 136 MMOL/L — SIGNIFICANT CHANGE UP (ref 135–145)
WBC # BLD: 6.17 K/UL — SIGNIFICANT CHANGE UP (ref 3.8–10.5)
WBC # FLD AUTO: 6.17 K/UL — SIGNIFICANT CHANGE UP (ref 3.8–10.5)

## 2021-11-14 PROCEDURE — 73560 X-RAY EXAM OF KNEE 1 OR 2: CPT | Mod: 26,RT

## 2021-11-14 RX ORDER — IBUPROFEN 200 MG
400 TABLET ORAL EVERY 8 HOURS
Refills: 0 | Status: DISCONTINUED | OUTPATIENT
Start: 2021-11-14 | End: 2021-11-16

## 2021-11-14 RX ORDER — OXYCODONE HYDROCHLORIDE 5 MG/1
5 TABLET ORAL EVERY 4 HOURS
Refills: 0 | Status: DISCONTINUED | OUTPATIENT
Start: 2021-11-14 | End: 2021-11-15

## 2021-11-14 RX ORDER — POTASSIUM PHOSPHATE, MONOBASIC POTASSIUM PHOSPHATE, DIBASIC 236; 224 MG/ML; MG/ML
15 INJECTION, SOLUTION INTRAVENOUS ONCE
Refills: 0 | Status: COMPLETED | OUTPATIENT
Start: 2021-11-14 | End: 2021-11-14

## 2021-11-14 RX ORDER — ACETAMINOPHEN 500 MG
975 TABLET ORAL EVERY 8 HOURS
Refills: 0 | Status: DISCONTINUED | OUTPATIENT
Start: 2021-11-14 | End: 2021-11-16

## 2021-11-14 RX ADMIN — Medication 400 MILLIGRAM(S): at 20:25

## 2021-11-14 RX ADMIN — PIPERACILLIN AND TAZOBACTAM 25 GRAM(S): 4; .5 INJECTION, POWDER, LYOPHILIZED, FOR SOLUTION INTRAVENOUS at 12:31

## 2021-11-14 RX ADMIN — Medication 1000 MILLIGRAM(S): at 02:30

## 2021-11-14 RX ADMIN — Medication 400 MILLIGRAM(S): at 02:00

## 2021-11-14 RX ADMIN — DEXTROSE MONOHYDRATE, SODIUM CHLORIDE, AND POTASSIUM CHLORIDE 50 MILLILITER(S): 50; .745; 4.5 INJECTION, SOLUTION INTRAVENOUS at 20:26

## 2021-11-14 RX ADMIN — DEXTROSE MONOHYDRATE, SODIUM CHLORIDE, AND POTASSIUM CHLORIDE 100 MILLILITER(S): 50; .745; 4.5 INJECTION, SOLUTION INTRAVENOUS at 04:14

## 2021-11-14 RX ADMIN — POTASSIUM PHOSPHATE, MONOBASIC POTASSIUM PHOSPHATE, DIBASIC 62.5 MILLIMOLE(S): 236; 224 INJECTION, SOLUTION INTRAVENOUS at 23:12

## 2021-11-14 RX ADMIN — ENOXAPARIN SODIUM 40 MILLIGRAM(S): 100 INJECTION SUBCUTANEOUS at 12:31

## 2021-11-14 RX ADMIN — Medication 1000 MILLIGRAM(S): at 08:47

## 2021-11-14 RX ADMIN — Medication 975 MILLIGRAM(S): at 22:01

## 2021-11-14 RX ADMIN — PIPERACILLIN AND TAZOBACTAM 25 GRAM(S): 4; .5 INJECTION, POWDER, LYOPHILIZED, FOR SOLUTION INTRAVENOUS at 04:14

## 2021-11-14 RX ADMIN — Medication 975 MILLIGRAM(S): at 22:31

## 2021-11-14 RX ADMIN — Medication 400 MILLIGRAM(S): at 20:55

## 2021-11-14 RX ADMIN — DEXTROSE MONOHYDRATE, SODIUM CHLORIDE, AND POTASSIUM CHLORIDE 50 MILLILITER(S): 50; .745; 4.5 INJECTION, SOLUTION INTRAVENOUS at 18:36

## 2021-11-14 RX ADMIN — Medication 400 MILLIGRAM(S): at 08:17

## 2021-11-14 RX ADMIN — Medication 975 MILLIGRAM(S): at 19:06

## 2021-11-14 RX ADMIN — Medication 975 MILLIGRAM(S): at 18:36
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
## 2021-11-14 NOTE — PHYSICAL THERAPY INITIAL EVALUATION ADULT - PRECAUTIONS/LIMITATIONS, REHAB EVAL
R tibia/fibula x-ray 11/11/21: There is cortical irregularity along the lateral tibial plateau, which may represent a lateral tibial plateau fracture. 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 CXR 11/11/21: Lucency medial to left brachiocephalic vessel. Question interposed lung due to obliquity of the image versus new pneumomediastinum. (-) Pelvis x-ray 11/11/21. Abdomen/Pelvis/Chest CT 11/11/21: Mild pneumoperitoneum and hemoperitoneum. Focal wall thickening of the transverse colon with adjacent extraluminal air raising suspicion for bowel injury/perforation at this site. Right lower quadrant mesenteric hematoma with active bleeding adjacent to the proximal ascending colon./fall precautions

## 2021-11-14 NOTE — PROGRESS NOTE ADULT - SUBJECTIVE AND OBJECTIVE BOX
S: Pt seen and examined.                MEDICATIONS  (STANDING):  acetaminophen   IVPB .. 1000 milliGRAM(s) IV Intermittent every 8 hours  dextrose 5% + sodium chloride 0.45% with potassium chloride 20 mEq/L 1000 milliLiter(s) (100 mL/Hr) IV Continuous <Continuous>  diphtheria/tetanus/pertussis (acellular) Vaccine (ADAcel) 0.5 milliLiter(s) IntraMuscular once  enoxaparin Injectable 40 milliGRAM(s) SubCutaneous every 24 hours  influenza   Vaccine 0.5 milliLiter(s) IntraMuscular once  piperacillin/tazobactam IVPB.. 3.375 Gram(s) IV Intermittent every 8 hours    MEDICATIONS  (PRN):  HYDROmorphone  Injectable 0.5 milliGRAM(s) IV Push every 4 hours PRN Severe Pain (7 - 10)  ketorolac   Injectable 15 milliGRAM(s) IV Push every 8 hours PRN Moderate Pain (4 - 6)      LABS:                        12.2   7.10  )-----------( 168      ( 13 Nov 2021 07:25 )             36.9     11-13    138  |  102  |  30<H>  ----------------------------<  123<H>  3.7   |  23  |  1.09    Ca    8.4      13 Nov 2021 07:23  Phos  2.0     11-13  Mg     2.1     11-13              Vital Signs Last 24 Hrs  T(C): 37.6 (14 Nov 2021 01:04), Max: 37.7 (13 Nov 2021 20:36)  T(F): 99.7 (14 Nov 2021 01:04), Max: 99.8 (13 Nov 2021 20:36)  HR: 86 (14 Nov 2021 01:04) (83 - 96)  BP: 146/85 (14 Nov 2021 01:04) (117/73 - 146/85)  BP(mean): --  RR: 18 (14 Nov 2021 01:04) (18 - 18)  SpO2: 92% (14 Nov 2021 01:04) (92% - 95%)      I&O's Summary    12 Nov 2021 07:01  -  13 Nov 2021 07:00  --------------------------------------------------------  IN: 1300 mL / OUT: 2150 mL / NET: -850 mL    13 Nov 2021 07:01  -  14 Nov 2021 04:58  --------------------------------------------------------  IN: 1300 mL / OUT: 2050 mL / NET: -750 mL      I&O's Detail    12 Nov 2021 07:01  -  13 Nov 2021 07:00  --------------------------------------------------------  IN:    dextrose 5% + sodium chloride 0.45% w/ Additives: 1200 mL    IV PiggyBack: 100 mL  Total IN: 1300 mL    OUT:    Nasogastric/Oral tube (mL): 1150 mL    Oral Fluid: 0 mL    Voided (mL): 1000 mL  Total OUT: 2150 mL    Total NET: -850 mL      13 Nov 2021 07:01  -  14 Nov 2021 04:58  --------------------------------------------------------  IN:    dextrose 5% + sodium chloride 0.45% w/ Additives: 1100 mL    IV PiggyBack: 200 mL  Total IN: 1300 mL    OUT:    Nasogastric/Oral tube (mL): 650 mL    Oral Fluid: 0 mL    Voided (mL): 1400 mL  Total OUT: 2050 mL    Total NET: -750 mL          General Appearance: Appears well, NAD  Neck: Supple  Chest: Equal expansion bilaterally, equal breath sounds  CV: Pulse regular presently  Abdomen: Soft, nontense, appropriate incisional tenderness, dressings clean and dry and intact  Extremities: warm, well perfused, Grossly symmetric, SCD's in place   .  .  .  .  . SURGERY PROGRESS NOTE  Hospital Day #11-11-21 (3d)  Procedure/Dx: Exploratory laparotomy, Repair of enterotomy, Repair of colotomy    Patient was seen and examined this am. Patient has undergone a NGT clamp trial this morning. He has been passing gas and has bowel movements during this hospitalization.     Vital Signs Last 24 Hrs  T(C): 36.2 (14 Nov 2021 09:29), Max: 37.7 (13 Nov 2021 20:36)  T(F): 97.2 (14 Nov 2021 09:29), Max: 99.8 (13 Nov 2021 20:36)  HR: 86 (14 Nov 2021 09:29) (81 - 89)  BP: 145/82 (14 Nov 2021 09:29) (128/78 - 149/89)  RR: 18 (14 Nov 2021 09:29) (18 - 18)  SpO2: 95% (14 Nov 2021 09:29) (92% - 95%)    PHYSICAL EXAM   General:  AAOx3 in NAD  Neck: Supple, FOM, no palpable masses  HEENT: NGT clamped.   Chest:  Equal expansion bilaterally, equal breath sounds  Abdomen:  Soft, nondistended, nontender to palpation in all four quadrants     I's & O's  11-13-21 @ 07:01  -  11-14-21 @ 07:00  --------------------------------------------------------  Total NET: 550 mL  11-14-21 @ 07:01  -  11-14-21 @ 12:42  --------------------------------------------------------  Total NET: -375 mL    MEDICATIONS:  DVT PROPHYLAXIS: enoxaparin Injectable 40 milliGRAM(s)  ANTIBIOTICS: piperacillin/tazobactam IVPB.. 3.375 Gram(s)    LAB/STUDIES:                  11.7   6.17  )-----------( 170      ( 14 Nov 2021 06:57 )             36.3   136  |  103  |  20  ----------------------------<  113<H>  3.6   |  20<L>  |  0.89  Ca    8.2<L>      14 Nov 2021 06:55  Phos  2.2     11-14  Mg     2.1     11-14

## 2021-11-14 NOTE — PHYSICAL THERAPY INITIAL EVALUATION ADULT - DISCHARGE DISPOSITION, PT EVAL
Patient is cleared for D/C home from physical therapy standpoint. Patient is agreeable, ODALYS Nascimento and  Cony Crawford aware./no skilled PT needs

## 2021-11-14 NOTE — PHYSICAL THERAPY INITIAL EVALUATION ADULT - ADDITIONAL COMMENTS
Pt lives with his family in a house with 10 steps to enter, +HR. Pt was independent with all ADLs and ambulation PTA. Pt did not use a AD for ambulation PTA. Pt states his family is available to assist when needed. +drives.

## 2021-11-15 LAB
ANION GAP SERPL CALC-SCNC: 14 MMOL/L — SIGNIFICANT CHANGE UP (ref 5–17)
BUN SERPL-MCNC: 16 MG/DL — SIGNIFICANT CHANGE UP (ref 7–23)
CALCIUM SERPL-MCNC: 8.1 MG/DL — LOW (ref 8.4–10.5)
CHLORIDE SERPL-SCNC: 104 MMOL/L — SIGNIFICANT CHANGE UP (ref 96–108)
CO2 SERPL-SCNC: 19 MMOL/L — LOW (ref 22–31)
CREAT SERPL-MCNC: 0.78 MG/DL — SIGNIFICANT CHANGE UP (ref 0.5–1.3)
GLUCOSE SERPL-MCNC: 93 MG/DL — SIGNIFICANT CHANGE UP (ref 70–99)
HCT VFR BLD CALC: 33.2 % — LOW (ref 39–50)
HGB BLD-MCNC: 10.8 G/DL — LOW (ref 13–17)
MAGNESIUM SERPL-MCNC: 1.9 MG/DL — SIGNIFICANT CHANGE UP (ref 1.6–2.6)
MCHC RBC-ENTMCNC: 28.3 PG — SIGNIFICANT CHANGE UP (ref 27–34)
MCHC RBC-ENTMCNC: 32.5 GM/DL — SIGNIFICANT CHANGE UP (ref 32–36)
MCV RBC AUTO: 86.9 FL — SIGNIFICANT CHANGE UP (ref 80–100)
NRBC # BLD: 0 /100 WBCS — SIGNIFICANT CHANGE UP (ref 0–0)
PHOSPHATE SERPL-MCNC: 2.6 MG/DL — SIGNIFICANT CHANGE UP (ref 2.5–4.5)
PLATELET # BLD AUTO: 169 K/UL — SIGNIFICANT CHANGE UP (ref 150–400)
POTASSIUM SERPL-MCNC: 3.8 MMOL/L — SIGNIFICANT CHANGE UP (ref 3.5–5.3)
POTASSIUM SERPL-SCNC: 3.8 MMOL/L — SIGNIFICANT CHANGE UP (ref 3.5–5.3)
RBC # BLD: 3.82 M/UL — LOW (ref 4.2–5.8)
RBC # FLD: 13.2 % — SIGNIFICANT CHANGE UP (ref 10.3–14.5)
SODIUM SERPL-SCNC: 137 MMOL/L — SIGNIFICANT CHANGE UP (ref 135–145)
WBC # BLD: 8.06 K/UL — SIGNIFICANT CHANGE UP (ref 3.8–10.5)
WBC # FLD AUTO: 8.06 K/UL — SIGNIFICANT CHANGE UP (ref 3.8–10.5)

## 2021-11-15 PROCEDURE — 73700 CT LOWER EXTREMITY W/O DYE: CPT | Mod: 26,RT

## 2021-11-15 PROCEDURE — 76377 3D RENDER W/INTRP POSTPROCES: CPT | Mod: 26

## 2021-11-15 PROCEDURE — 99221 1ST HOSP IP/OBS SF/LOW 40: CPT

## 2021-11-15 RX ORDER — OXYCODONE HYDROCHLORIDE 5 MG/1
5 TABLET ORAL EVERY 6 HOURS
Refills: 0 | Status: DISCONTINUED | OUTPATIENT
Start: 2021-11-15 | End: 2021-11-16

## 2021-11-15 RX ORDER — CALCIUM CARBONATE 500(1250)
2 TABLET ORAL ONCE
Refills: 0 | Status: COMPLETED | OUTPATIENT
Start: 2021-11-15 | End: 2021-11-15

## 2021-11-15 RX ORDER — SODIUM,POTASSIUM PHOSPHATES 278-250MG
2 POWDER IN PACKET (EA) ORAL ONCE
Refills: 0 | Status: COMPLETED | OUTPATIENT
Start: 2021-11-15 | End: 2021-11-15

## 2021-11-15 RX ORDER — MAGNESIUM SULFATE 500 MG/ML
1 VIAL (ML) INJECTION ONCE
Refills: 0 | Status: COMPLETED | OUTPATIENT
Start: 2021-11-15 | End: 2021-11-15

## 2021-11-15 RX ADMIN — Medication 975 MILLIGRAM(S): at 15:14

## 2021-11-15 RX ADMIN — Medication 400 MILLIGRAM(S): at 13:26

## 2021-11-15 RX ADMIN — Medication 2 TABLET(S): at 23:42

## 2021-11-15 RX ADMIN — Medication 975 MILLIGRAM(S): at 14:44

## 2021-11-15 RX ADMIN — Medication 100 GRAM(S): at 12:54

## 2021-11-15 RX ADMIN — ENOXAPARIN SODIUM 40 MILLIGRAM(S): 100 INJECTION SUBCUTANEOUS at 12:56

## 2021-11-15 RX ADMIN — Medication 975 MILLIGRAM(S): at 06:10

## 2021-11-15 RX ADMIN — Medication 975 MILLIGRAM(S): at 23:29

## 2021-11-15 RX ADMIN — Medication 400 MILLIGRAM(S): at 20:40

## 2021-11-15 RX ADMIN — Medication 400 MILLIGRAM(S): at 12:56

## 2021-11-15 RX ADMIN — Medication 400 MILLIGRAM(S): at 04:58

## 2021-11-15 RX ADMIN — Medication 2 PACKET(S): at 12:54

## 2021-11-15 RX ADMIN — Medication 400 MILLIGRAM(S): at 05:30

## 2021-11-15 RX ADMIN — Medication 975 MILLIGRAM(S): at 05:47

## 2021-11-15 RX ADMIN — Medication 400 MILLIGRAM(S): at 21:10
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
## 2021-11-15 NOTE — CONSULT NOTE ADULT - ATTENDING COMMENTS
R proximal tibia fx that does not involve the major wt bearing portion of the proximal tibial condyle. Can be tx wo surgery.  NWB. PT. crutch training. DVT ppx. f/u as out pt

## 2021-11-15 NOTE — PROGRESS NOTE ADULT - SUBJECTIVE AND OBJECTIVE BOX
ACS DAILY PROGRESS NOTE:       SUBJECTIVE/ROS: No acute events overnight          MEDICATIONS  (STANDING):  acetaminophen     Tablet .. 975 milliGRAM(s) Oral every 8 hours  dextrose 5% + sodium chloride 0.45% with potassium chloride 20 mEq/L 1000 milliLiter(s) (50 mL/Hr) IV Continuous <Continuous>  enoxaparin Injectable 40 milliGRAM(s) SubCutaneous every 24 hours  ibuprofen  Tablet. 400 milliGRAM(s) Oral every 8 hours  influenza   Vaccine 0.5 milliLiter(s) IntraMuscular once    MEDICATIONS  (PRN):  oxyCODONE    IR 5 milliGRAM(s) Oral every 4 hours PRN Severe Pain (7 - 10)      OBJECTIVE:    Vital Signs Last 24 Hrs  T(C): 36.7 (15 Nov 2021 00:53), Max: 37.3 (14 Nov 2021 16:58)  T(F): 98.1 (15 Nov 2021 00:53), Max: 99.2 (14 Nov 2021 16:58)  HR: 60 (15 Nov 2021 00:53) (60 - 98)  BP: 129/79 (15 Nov 2021 00:53) (129/79 - 153/79)  BP(mean): --  RR: 18 (15 Nov 2021 00:53) (18 - 18)  SpO2: 97% (15 Nov 2021 00:53) (92% - 97%)    PHYSICAL EXAM   General:  AAOx3 in NAD  Neck: Supple, FOM, no palpable masses  Chest:  Equal expansion bilaterally, equal breath sounds  Abdomen:  Soft, nondistended, nontender to palpation in all four quadrants       I&O's Detail    13 Nov 2021 07:01  -  14 Nov 2021 07:00  --------------------------------------------------------  IN:    dextrose 5% + sodium chloride 0.45% w/ Additives: 2300 mL    IV PiggyBack: 300 mL  Total IN: 2600 mL    OUT:    Nasogastric/Oral tube (mL): 650 mL    Oral Fluid: 0 mL    Voided (mL): 1400 mL  Total OUT: 2050 mL    Total NET: 550 mL      14 Nov 2021 07:01  -  15 Nov 2021 02:29  --------------------------------------------------------  IN:    dextrose 5% + sodium chloride 0.45% w/ Additives: 1200 mL    IV PiggyBack: 100 mL    IV PiggyBack: 250 mL    Oral Fluid: 520 mL  Total IN: 2070 mL    OUT:    Voided (mL): 1350 mL  Total OUT: 1350 mL    Total NET: 720 mL          Daily     Daily     LABS:                        11.7   6.17  )-----------( 170      ( 14 Nov 2021 06:57 )             36.3     11-14    136  |  103  |  20  ----------------------------<  113<H>  3.6   |  20<L>  |  0.89    Ca    8.2<L>      14 Nov 2021 06:55  Phos  2.2     11-14  Mg     2.1     11-14                             ACS DAILY PROGRESS NOTE:       SUBJECTIVE: Pt seen and examined at bedside. There were no acute overnight events. The patient has no complaints this morning. His pain is controlled. Hedenies CP, SOB, fevers, chills, N/V/D.           MEDICATIONS  (STANDING):  acetaminophen     Tablet .. 975 milliGRAM(s) Oral every 8 hours  dextrose 5% + sodium chloride 0.45% with potassium chloride 20 mEq/L 1000 milliLiter(s) (50 mL/Hr) IV Continuous <Continuous>  enoxaparin Injectable 40 milliGRAM(s) SubCutaneous every 24 hours  ibuprofen  Tablet. 400 milliGRAM(s) Oral every 8 hours  influenza   Vaccine 0.5 milliLiter(s) IntraMuscular once    MEDICATIONS  (PRN):  oxyCODONE    IR 5 milliGRAM(s) Oral every 4 hours PRN Severe Pain (7 - 10)      OBJECTIVE:    Vital Signs Last 24 Hrs  T(C): 36.7 (15 Nov 2021 00:53), Max: 37.3 (14 Nov 2021 16:58)  T(F): 98.1 (15 Nov 2021 00:53), Max: 99.2 (14 Nov 2021 16:58)  HR: 60 (15 Nov 2021 00:53) (60 - 98)  BP: 129/79 (15 Nov 2021 00:53) (129/79 - 153/79)  BP(mean): --  RR: 18 (15 Nov 2021 00:53) (18 - 18)  SpO2: 97% (15 Nov 2021 00:53) (92% - 97%)    PHYSICAL EXAM   General:  AAOx3 in NAD  Neck: Supple, FOM, no palpable masses  Chest:  Equal expansion bilaterally, equal breath sounds  Abdomen:  Soft, nondistended, appropriate tenderness over surgical site, incision site C/D/I      I&O's Detail    13 Nov 2021 07:01  -  14 Nov 2021 07:00  --------------------------------------------------------  IN:    dextrose 5% + sodium chloride 0.45% w/ Additives: 2300 mL    IV PiggyBack: 300 mL  Total IN: 2600 mL    OUT:    Nasogastric/Oral tube (mL): 650 mL    Oral Fluid: 0 mL    Voided (mL): 1400 mL  Total OUT: 2050 mL    Total NET: 550 mL      14 Nov 2021 07:01  -  15 Nov 2021 02:29  --------------------------------------------------------  IN:    dextrose 5% + sodium chloride 0.45% w/ Additives: 1200 mL    IV PiggyBack: 100 mL    IV PiggyBack: 250 mL    Oral Fluid: 520 mL  Total IN: 2070 mL    OUT:    Voided (mL): 1350 mL  Total OUT: 1350 mL    Total NET: 720 mL          Daily     Daily     LABS:                        11.7   6.17  )-----------( 170      ( 14 Nov 2021 06:57 )             36.3     11-14    136  |  103  |  20  ----------------------------<  113<H>  3.6   |  20<L>  |  0.89    Ca    8.2<L>      14 Nov 2021 06:55  Phos  2.2     11-14  Mg     2.1     11-14
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
## 2021-11-16 ENCOUNTER — TRANSCRIPTION ENCOUNTER (OUTPATIENT)
Age: 51
End: 2021-11-16

## 2021-11-16 VITALS
DIASTOLIC BLOOD PRESSURE: 84 MMHG | OXYGEN SATURATION: 97 % | SYSTOLIC BLOOD PRESSURE: 150 MMHG | TEMPERATURE: 98 F | HEART RATE: 77 BPM | RESPIRATION RATE: 18 BRPM

## 2021-11-16 LAB
ANION GAP SERPL CALC-SCNC: 15 MMOL/L — SIGNIFICANT CHANGE UP (ref 5–17)
BUN SERPL-MCNC: 16 MG/DL — SIGNIFICANT CHANGE UP (ref 7–23)
CALCIUM SERPL-MCNC: 8.5 MG/DL — SIGNIFICANT CHANGE UP (ref 8.4–10.5)
CHLORIDE SERPL-SCNC: 100 MMOL/L — SIGNIFICANT CHANGE UP (ref 96–108)
CO2 SERPL-SCNC: 21 MMOL/L — LOW (ref 22–31)
CREAT SERPL-MCNC: 0.69 MG/DL — SIGNIFICANT CHANGE UP (ref 0.5–1.3)
GLUCOSE SERPL-MCNC: 104 MG/DL — HIGH (ref 70–99)
HCT VFR BLD CALC: 31.5 % — LOW (ref 39–50)
HGB BLD-MCNC: 10.6 G/DL — LOW (ref 13–17)
MAGNESIUM SERPL-MCNC: 1.9 MG/DL — SIGNIFICANT CHANGE UP (ref 1.6–2.6)
MCHC RBC-ENTMCNC: 28.5 PG — SIGNIFICANT CHANGE UP (ref 27–34)
MCHC RBC-ENTMCNC: 33.7 GM/DL — SIGNIFICANT CHANGE UP (ref 32–36)
MCV RBC AUTO: 84.7 FL — SIGNIFICANT CHANGE UP (ref 80–100)
NRBC # BLD: 0 /100 WBCS — SIGNIFICANT CHANGE UP (ref 0–0)
PHOSPHATE SERPL-MCNC: 3.3 MG/DL — SIGNIFICANT CHANGE UP (ref 2.5–4.5)
PLATELET # BLD AUTO: 190 K/UL — SIGNIFICANT CHANGE UP (ref 150–400)
POTASSIUM SERPL-MCNC: 3.3 MMOL/L — LOW (ref 3.5–5.3)
POTASSIUM SERPL-SCNC: 3.3 MMOL/L — LOW (ref 3.5–5.3)
RBC # BLD: 3.72 M/UL — LOW (ref 4.2–5.8)
RBC # FLD: 13 % — SIGNIFICANT CHANGE UP (ref 10.3–14.5)
SODIUM SERPL-SCNC: 136 MMOL/L — SIGNIFICANT CHANGE UP (ref 135–145)
WBC # BLD: 8.24 K/UL — SIGNIFICANT CHANGE UP (ref 3.8–10.5)
WBC # FLD AUTO: 8.24 K/UL — SIGNIFICANT CHANGE UP (ref 3.8–10.5)

## 2021-11-16 PROCEDURE — U0003: CPT

## 2021-11-16 PROCEDURE — 82803 BLOOD GASES ANY COMBINATION: CPT

## 2021-11-16 PROCEDURE — 82330 ASSAY OF CALCIUM: CPT

## 2021-11-16 PROCEDURE — 71045 X-RAY EXAM CHEST 1 VIEW: CPT

## 2021-11-16 PROCEDURE — U0005: CPT

## 2021-11-16 PROCEDURE — 85730 THROMBOPLASTIN TIME PARTIAL: CPT

## 2021-11-16 PROCEDURE — 85025 COMPLETE CBC W/AUTO DIFF WBC: CPT

## 2021-11-16 PROCEDURE — 85014 HEMATOCRIT: CPT

## 2021-11-16 PROCEDURE — C1889: CPT

## 2021-11-16 PROCEDURE — 86900 BLOOD TYPING SEROLOGIC ABO: CPT

## 2021-11-16 PROCEDURE — 73560 X-RAY EXAM OF KNEE 1 OR 2: CPT

## 2021-11-16 PROCEDURE — 84132 ASSAY OF SERUM POTASSIUM: CPT

## 2021-11-16 PROCEDURE — 86901 BLOOD TYPING SEROLOGIC RH(D): CPT

## 2021-11-16 PROCEDURE — 83735 ASSAY OF MAGNESIUM: CPT

## 2021-11-16 PROCEDURE — 85018 HEMOGLOBIN: CPT

## 2021-11-16 PROCEDURE — C1769: CPT

## 2021-11-16 PROCEDURE — 82435 ASSAY OF BLOOD CHLORIDE: CPT

## 2021-11-16 PROCEDURE — 86850 RBC ANTIBODY SCREEN: CPT

## 2021-11-16 PROCEDURE — 80053 COMPREHEN METABOLIC PANEL: CPT

## 2021-11-16 PROCEDURE — 85027 COMPLETE CBC AUTOMATED: CPT

## 2021-11-16 PROCEDURE — 80048 BASIC METABOLIC PNL TOTAL CA: CPT

## 2021-11-16 PROCEDURE — 97116 GAIT TRAINING THERAPY: CPT

## 2021-11-16 PROCEDURE — 83690 ASSAY OF LIPASE: CPT

## 2021-11-16 PROCEDURE — 97161 PT EVAL LOW COMPLEX 20 MIN: CPT

## 2021-11-16 PROCEDURE — 93005 ELECTROCARDIOGRAM TRACING: CPT

## 2021-11-16 PROCEDURE — 36415 COLL VENOUS BLD VENIPUNCTURE: CPT

## 2021-11-16 PROCEDURE — 72170 X-RAY EXAM OF PELVIS: CPT

## 2021-11-16 PROCEDURE — 96374 THER/PROPH/DIAG INJ IV PUSH: CPT

## 2021-11-16 PROCEDURE — 84100 ASSAY OF PHOSPHORUS: CPT

## 2021-11-16 PROCEDURE — 84295 ASSAY OF SERUM SODIUM: CPT

## 2021-11-16 PROCEDURE — 82947 ASSAY GLUCOSE BLOOD QUANT: CPT

## 2021-11-16 PROCEDURE — 83605 ASSAY OF LACTIC ACID: CPT

## 2021-11-16 PROCEDURE — 82565 ASSAY OF CREATININE: CPT

## 2021-11-16 PROCEDURE — 99291 CRITICAL CARE FIRST HOUR: CPT

## 2021-11-16 PROCEDURE — 73590 X-RAY EXAM OF LOWER LEG: CPT

## 2021-11-16 PROCEDURE — 76377 3D RENDER W/INTRP POSTPROCES: CPT

## 2021-11-16 PROCEDURE — 86769 SARS-COV-2 COVID-19 ANTIBODY: CPT

## 2021-11-16 PROCEDURE — 73700 CT LOWER EXTREMITY W/O DYE: CPT

## 2021-11-16 PROCEDURE — 96375 TX/PRO/DX INJ NEW DRUG ADDON: CPT

## 2021-11-16 PROCEDURE — 71260 CT THORAX DX C+: CPT | Mod: MA

## 2021-11-16 PROCEDURE — 74177 CT ABD & PELVIS W/CONTRAST: CPT | Mod: MA

## 2021-11-16 PROCEDURE — 85610 PROTHROMBIN TIME: CPT

## 2021-11-16 RX ORDER — PANTOPRAZOLE SODIUM 20 MG/1
40 TABLET, DELAYED RELEASE ORAL
Refills: 0 | Status: DISCONTINUED | OUTPATIENT
Start: 2021-11-16 | End: 2021-11-16

## 2021-11-16 RX ORDER — POTASSIUM CHLORIDE 20 MEQ
20 PACKET (EA) ORAL ONCE
Refills: 0 | Status: COMPLETED | OUTPATIENT
Start: 2021-11-16 | End: 2021-11-16

## 2021-11-16 RX ORDER — OXYCODONE HYDROCHLORIDE 5 MG/1
1 TABLET ORAL
Qty: 12 | Refills: 0
Start: 2021-11-16 | End: 2021-11-18

## 2021-11-16 RX ORDER — IBUPROFEN 200 MG
1 TABLET ORAL
Qty: 0 | Refills: 0 | DISCHARGE
Start: 2021-11-16

## 2021-11-16 RX ORDER — POTASSIUM CHLORIDE 20 MEQ
40 PACKET (EA) ORAL ONCE
Refills: 0 | Status: COMPLETED | OUTPATIENT
Start: 2021-11-16 | End: 2021-11-16

## 2021-11-16 RX ORDER — PANTOPRAZOLE SODIUM 20 MG/1
1 TABLET, DELAYED RELEASE ORAL
Qty: 0 | Refills: 0 | DISCHARGE
Start: 2021-11-16

## 2021-11-16 RX ORDER — ACETAMINOPHEN 500 MG
3 TABLET ORAL
Qty: 0 | Refills: 0 | DISCHARGE
Start: 2021-11-16

## 2021-11-16 RX ADMIN — Medication 400 MILLIGRAM(S): at 14:12

## 2021-11-16 RX ADMIN — Medication 400 MILLIGRAM(S): at 05:43

## 2021-11-16 RX ADMIN — Medication 975 MILLIGRAM(S): at 06:02

## 2021-11-16 RX ADMIN — PANTOPRAZOLE SODIUM 40 MILLIGRAM(S): 20 TABLET, DELAYED RELEASE ORAL at 10:07

## 2021-11-16 RX ADMIN — Medication 975 MILLIGRAM(S): at 06:33

## 2021-11-16 RX ADMIN — Medication 975 MILLIGRAM(S): at 14:13

## 2021-11-16 RX ADMIN — Medication 400 MILLIGRAM(S): at 14:43

## 2021-11-16 RX ADMIN — Medication 40 MILLIEQUIVALENT(S): at 10:07

## 2021-11-16 RX ADMIN — Medication 20 MILLIEQUIVALENT(S): at 14:12

## 2021-11-16 RX ADMIN — ENOXAPARIN SODIUM 40 MILLIGRAM(S): 100 INJECTION SUBCUTANEOUS at 10:08

## 2021-11-16 RX ADMIN — Medication 975 MILLIGRAM(S): at 14:43

## 2021-11-16 RX ADMIN — Medication 400 MILLIGRAM(S): at 05:13

## 2021-11-16 NOTE — DISCHARGE NOTE NURSING/CASE MANAGEMENT/SOCIAL WORK - PATIENT PORTAL LINK FT
You can access the FollowMyHealth Patient Portal offered by [facility] by registering at the following website: [email or web address]. By joining [employer]’s FollowMyHealth portal, you will also be able to view your health information using other applications (apps) compatible with our system.

## 2021-11-16 NOTE — DISCHARGE NOTE PROVIDER - NSDCCPTREATMENT_GEN_ALL_CORE_FT
PRINCIPAL PROCEDURE  Procedure: Exploratory laparotomy  Findings and Treatment: -status post repair of enterotomy and repair of colotomy  WOUND CARE: *********************************  BATHING: Please do not submerge wound underwater. You may shower and/or sponge bathe.  ACTIVITY: No heavy lifting anything more than 10-15lbs or straining. Otherwise, you may return to your usual level of physical activity. If you are taking narcotic pain medication (such as oxycodone), do NOT drive a car, operate machinery or make important decisions.  DIET: regular diet  NOTIFY YOUR SURGEON IF: You have any bleeding that does not stop, any pus draining from your wound, any fever (over 100.4 F) or chills, persistent nausea/vomiting with inability to tolerate food or liquids, persistent diarrhea, or if your pain is not controlled on your discharge pain medications.  FOLLOW-UP:  1. Please call to make a follow-up appointment within 1-2 weeks of discharge with your surgeon Dr Renteria  2. Please follow up with your primary care physician in 1-2 weeks regarding your hospitalization.      SECONDARY PROCEDURE  Procedure: Repair of enterotomy  Findings and Treatment: see above     PRINCIPAL PROCEDURE  Procedure: Exploratory laparotomy  Findings and Treatment: -status post repair of enterotomy and repair of colotomy        SECONDARY PROCEDURE  Procedure: Repair of enterotomy  Findings and Treatment: see above

## 2021-11-16 NOTE — DISCHARGE NOTE PROVIDER - NSDCFUADDAPPT_GEN_ALL_CORE_FT
YOUR PRIMARY CARE DOCTOR--Please call for a follow up appointment in the next 1-2 weeks regarding your recent surgery and hospitalization

## 2021-11-16 NOTE — DISCHARGE NOTE PROVIDER - NSDCACTIVITY_GEN_ALL_CORE
Do not drive or operate machinery/Do not make important decisions/No heavy lifting/straining/Follow Instructions Provided by your Surgical Team

## 2021-11-16 NOTE — DISCHARGE NOTE PROVIDER - NSDCFUADDINST_GEN_ALL_CORE_FT
*Nondisplaced right lateral plateau fracture  -recommendations as per orthopaedic team: non weight bearing to right lower extremity, pain control, outpatient follow up with Dr Brandt in 1-2 weeks, please call to schedule (contact information above)    Please utilize Tylenol or Motrin prior to using narcotics (ie oxycodone).  Do not take more than 4 grams of Tylenol in a 24 hour period.     Taking narcotic pain medication may cause constipation. You can take over the counter stool softeners like Miralax or Senna to prevent this. Please do not take stool softeners if you are having loose bowel movements.    Some pain medications can also make you nauseous, sleepy, dizzy and itchy-these are the most common side effects.      *Nondisplaced right lateral plateau fracture  -recommendations as per orthopaedic team: non weight bearing to right lower extremity, pain control, outpatient follow up with Dr Alfaro in 1-2 weeks, please call to schedule (contact information above)    Please utilize Tylenol or Motrin prior to using narcotics (ie oxycodone).  Do not take more than 4 grams of Tylenol in a 24 hour period.     Taking narcotic pain medication may cause constipation. You can take over the counter stool softeners like Miralax or Senna to prevent this. Please do not take stool softeners if you are having loose bowel movements.    Some pain medications can also make you nauseous, sleepy, dizzy and itchy-these are the most common side effects.
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
## 2021-11-16 NOTE — PROGRESS NOTE ADULT - SUBJECTIVE AND OBJECTIVE BOX
Surgery Progress Note    SUBJECTIVE: Pt seen and examined at bedside. Patient comfortable and in no-apparent distress. No nausea, vomiting, diarrhea. Pain is controlled.       Vital Signs Last 24 Hrs  T(C): 36.7 (16 Nov 2021 11:29), Max: 37.4 (16 Nov 2021 04:49)  T(F): 98 (16 Nov 2021 11:29), Max: 99.4 (16 Nov 2021 04:49)  HR: 77 (16 Nov 2021 11:29) (76 - 88)  BP: 150/84 (16 Nov 2021 11:29) (136/81 - 169/75)  BP(mean): --  RR: 18 (16 Nov 2021 11:29) (18 - 18)  SpO2: 97% (16 Nov 2021 11:29) (96% - 98%)    Physical Exam:  General Appearance: Appears well, NAD  Respiratory: No labored breathing  CV: Pulse regularly present  Abdomen: Soft, nondistended, appropriate tenderness over surgical site, incision site C/D/I  Ext: RLE in [facility]      LABS:                        10.6   8.24  )-----------( 190      ( 16 Nov 2021 07:09 )             31.5     11-16    136  |  100  |  16  ----------------------------<  104<H>  3.3<L>   |  21<L>  |  0.69    Ca    8.5      16 Nov 2021 07:07  Phos  3.3     11-16  Mg     1.9     11-16            INs and OUTs:    11-15-21 @ 07:01  -  11-16-21 @ 07:00  --------------------------------------------------------  IN: 440 mL / OUT: 1350 mL / NET: -910 mL    11-16-21 @ 07:01  -  11-16-21 @ 12:14  --------------------------------------------------------  IN: 240 mL / OUT: 500 mL / NET: -260 mL
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
## 2021-11-16 NOTE — PROVIDER CONTACT NOTE (OTHER) - ASSESSMENT
Pt c/o heartburn, pt had regular diet during dinner, pt voiding, no BM yet today. No c/o pain, abd bloated, soft, bowel sounds audible.

## 2021-11-16 NOTE — DISCHARGE NOTE PROVIDER - PROVIDER TOKENS
PROVIDER:[TOKEN:[[id number]],FOLLOWUP:[2 weeks]],PROVIDER:[TOKEN:[[id number]],FOLLOWUP:[2 weeks]]

## 2021-11-16 NOTE — DISCHARGE NOTE PROVIDER - NSDCMRMEDTOKEN_GEN_ALL_CORE_FT
acetaminophen 325 mg oral tablet: 3 tab(s) orally every 8 hours  ibuprofen 400 mg oral tablet: 1 tab(s) orally every 8 hours  oxyCODONE 5 mg oral tablet: 1 tab(s) orally every 6 hours, As needed, Severe Pain (7 - 10) MDD:4 tabs  pantoprazole 40 mg oral delayed release tablet: 1 tab(s) orally once a day (before a meal)

## 2021-11-16 NOTE — DISCHARGE NOTE PROVIDER - CARE PROVIDERS DIRECT ADDRESSES
[email or web address]

## 2021-11-16 NOTE — DISCHARGE NOTE PROVIDER - CARE PROVIDER_API CALL
Garret Renteria (MD)  Surgery; Surgical Critical Care  [address], [address]  [place] [address]  Phone: [phone]  Fax: [phone]  Follow Up Time: 2 weeks    Kishore Alfaro)  Orthopaedic Surgery  [address], [address]  [place] [address]  Phone: [phone]  Fax: [phone]  Follow Up Time: 2 weeks

## 2021-11-18 PROBLEM — Z78.9 OTHER SPECIFIED HEALTH STATUS: Chronic | Status: ACTIVE | Noted: 2021-11-11

## 2021-11-18 PROBLEM — Z00.00 ENCOUNTER FOR PREVENTIVE HEALTH EXAMINATION: Status: ACTIVE | Noted: 2021-11-18

## 2021-11-22 ENCOUNTER — INPATIENT (INPATIENT)
Facility: HOSPITAL | Age: 51
LOS: 5 days | Discharge: HOME | DRG: 862 | End: 2021-11-28
Attending: SURGERY | Admitting: SURGERY
Payer: COMMERCIAL

## 2021-11-22 VITALS
DIASTOLIC BLOOD PRESSURE: 53 MMHG | WEIGHT: 175.05 LBS | OXYGEN SATURATION: 98 % | HEART RATE: 103 BPM | SYSTOLIC BLOOD PRESSURE: 116 MMHG | RESPIRATION RATE: 18 BRPM | TEMPERATURE: 98 F

## 2021-11-22 LAB
ALBUMIN SERPL ELPH-MCNC: 3.2 G/DL — LOW (ref 3.3–5)
ALP SERPL-CCNC: 166 U/L — HIGH (ref 40–120)
ALT FLD-CCNC: 43 U/L — SIGNIFICANT CHANGE UP (ref 10–45)
ANION GAP SERPL CALC-SCNC: 16 MMOL/L — SIGNIFICANT CHANGE UP (ref 5–17)
AST SERPL-CCNC: 31 U/L — SIGNIFICANT CHANGE UP (ref 10–40)
BASE EXCESS BLDV CALC-SCNC: 2.5 MMOL/L — HIGH (ref -2–2)
BASOPHILS # BLD AUTO: 0.02 K/UL — SIGNIFICANT CHANGE UP (ref 0–0.2)
BASOPHILS NFR BLD AUTO: 0.1 % — SIGNIFICANT CHANGE UP (ref 0–2)
BILIRUB SERPL-MCNC: 0.8 MG/DL — SIGNIFICANT CHANGE UP (ref 0.2–1.2)
BUN SERPL-MCNC: 19 MG/DL — SIGNIFICANT CHANGE UP (ref 7–23)
CA-I SERPL-SCNC: 1.1 MMOL/L — LOW (ref 1.15–1.33)
CALCIUM SERPL-MCNC: 8.8 MG/DL — SIGNIFICANT CHANGE UP (ref 8.4–10.5)
CHLORIDE BLDV-SCNC: 97 MMOL/L — SIGNIFICANT CHANGE UP (ref 96–108)
CHLORIDE SERPL-SCNC: 95 MMOL/L — LOW (ref 96–108)
CO2 BLDV-SCNC: 29 MMOL/L — HIGH (ref 22–26)
CO2 SERPL-SCNC: 22 MMOL/L — SIGNIFICANT CHANGE UP (ref 22–31)
CREAT SERPL-MCNC: 1.44 MG/DL — HIGH (ref 0.5–1.3)
EOSINOPHIL # BLD AUTO: 0.04 K/UL — SIGNIFICANT CHANGE UP (ref 0–0.5)
EOSINOPHIL NFR BLD AUTO: 0.3 % — SIGNIFICANT CHANGE UP (ref 0–6)
GAS PNL BLDV: 129 MMOL/L — LOW (ref 136–145)
GAS PNL BLDV: SIGNIFICANT CHANGE UP
GLUCOSE BLDV-MCNC: 115 MG/DL — HIGH (ref 70–99)
GLUCOSE SERPL-MCNC: 120 MG/DL — HIGH (ref 70–99)
HCO3 BLDV-SCNC: 28 MMOL/L — SIGNIFICANT CHANGE UP (ref 22–29)
HCT VFR BLD CALC: 33.2 % — LOW (ref 39–50)
HCT VFR BLDA CALC: 44 % — SIGNIFICANT CHANGE UP (ref 39–51)
HGB BLD CALC-MCNC: 14.5 G/DL — SIGNIFICANT CHANGE UP (ref 12.6–17.4)
HGB BLD-MCNC: 10.7 G/DL — LOW (ref 13–17)
IMM GRANULOCYTES NFR BLD AUTO: 0.7 % — SIGNIFICANT CHANGE UP (ref 0–1.5)
LACTATE BLDV-MCNC: 2 MMOL/L — SIGNIFICANT CHANGE UP (ref 0.7–2)
LIDOCAIN IGE QN: 89 U/L — HIGH (ref 7–60)
LYMPHOCYTES # BLD AUTO: 1.4 K/UL — SIGNIFICANT CHANGE UP (ref 1–3.3)
LYMPHOCYTES # BLD AUTO: 9.6 % — LOW (ref 13–44)
MCHC RBC-ENTMCNC: 28.2 PG — SIGNIFICANT CHANGE UP (ref 27–34)
MCHC RBC-ENTMCNC: 32.2 GM/DL — SIGNIFICANT CHANGE UP (ref 32–36)
MCV RBC AUTO: 87.4 FL — SIGNIFICANT CHANGE UP (ref 80–100)
MONOCYTES # BLD AUTO: 0.97 K/UL — HIGH (ref 0–0.9)
MONOCYTES NFR BLD AUTO: 6.6 % — SIGNIFICANT CHANGE UP (ref 2–14)
NEUTROPHILS # BLD AUTO: 12.12 K/UL — HIGH (ref 1.8–7.4)
NEUTROPHILS NFR BLD AUTO: 82.7 % — HIGH (ref 43–77)
NRBC # BLD: 0 /100 WBCS — SIGNIFICANT CHANGE UP (ref 0–0)
PCO2 BLDV: 45 MMHG — SIGNIFICANT CHANGE UP (ref 42–55)
PH BLDV: 7.4 — SIGNIFICANT CHANGE UP (ref 7.32–7.43)
PLATELET # BLD AUTO: 615 K/UL — HIGH (ref 150–400)
PO2 BLDV: 12 MMHG — LOW (ref 25–45)
POTASSIUM BLDV-SCNC: 3.6 MMOL/L — SIGNIFICANT CHANGE UP (ref 3.5–5.1)
POTASSIUM SERPL-MCNC: 3.6 MMOL/L — SIGNIFICANT CHANGE UP (ref 3.5–5.3)
POTASSIUM SERPL-SCNC: 3.6 MMOL/L — SIGNIFICANT CHANGE UP (ref 3.5–5.3)
PROT SERPL-MCNC: 7.5 G/DL — SIGNIFICANT CHANGE UP (ref 6–8.3)
RBC # BLD: 3.8 M/UL — LOW (ref 4.2–5.8)
RBC # FLD: 12.9 % — SIGNIFICANT CHANGE UP (ref 10.3–14.5)
SAO2 % BLDV: 14 % — LOW (ref 67–88)
SODIUM SERPL-SCNC: 133 MMOL/L — LOW (ref 135–145)
WBC # BLD: 14.65 K/UL — HIGH (ref 3.8–10.5)
WBC # FLD AUTO: 14.65 K/UL — HIGH (ref 3.8–10.5)

## 2021-11-22 PROCEDURE — G1004: CPT

## 2021-11-22 PROCEDURE — 74177 CT ABD & PELVIS W/CONTRAST: CPT | Mod: 26,MG

## 2021-11-22 PROCEDURE — 99285 EMERGENCY DEPT VISIT HI MDM: CPT

## 2021-11-22 RX ORDER — PIPERACILLIN AND TAZOBACTAM 4; .5 G/20ML; G/20ML
3.38 INJECTION, POWDER, LYOPHILIZED, FOR SOLUTION INTRAVENOUS ONCE
Refills: 0 | Status: COMPLETED | OUTPATIENT
Start: 2021-11-22 | End: 2021-11-22

## 2021-11-22 RX ORDER — VANCOMYCIN HCL 1 G
1000 VIAL (EA) INTRAVENOUS ONCE
Refills: 0 | Status: COMPLETED | OUTPATIENT
Start: 2021-11-22 | End: 2021-11-22

## 2021-11-22 RX ORDER — SODIUM CHLORIDE 9 MG/ML
1000 INJECTION, SOLUTION INTRAVENOUS ONCE
Refills: 0 | Status: COMPLETED | OUTPATIENT
Start: 2021-11-22 | End: 2021-11-22

## 2021-11-22 RX ORDER — ACETAMINOPHEN 500 MG
650 TABLET ORAL ONCE
Refills: 0 | Status: COMPLETED | OUTPATIENT
Start: 2021-11-22 | End: 2021-11-22

## 2021-11-22 RX ADMIN — Medication 250 MILLIGRAM(S): at 23:25

## 2021-11-22 RX ADMIN — SODIUM CHLORIDE 1000 MILLILITER(S): 9 INJECTION, SOLUTION INTRAVENOUS at 22:32

## 2021-11-22 RX ADMIN — Medication 650 MILLIGRAM(S): at 22:46

## 2021-11-22 RX ADMIN — PIPERACILLIN AND TAZOBACTAM 200 GRAM(S): 4; .5 INJECTION, POWDER, LYOPHILIZED, FOR SOLUTION INTRAVENOUS at 22:32
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
## 2021-11-22 NOTE — ED PROVIDER NOTE - CARE PLAN
1 Principal Discharge DX:	Abscess of abdominal cavity  Secondary Diagnosis:	Sepsis  Secondary Diagnosis:	Open wound of anterior abdominal wall
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
## 2021-11-22 NOTE — ED PROVIDER NOTE - NS ED ROS FT
Gen: Denies fevers  Skin: + purulent discharge from midline incisional wound  Resp: Denies SOB  GI: Denies nausea, vomiting. + mild abd pain  all other ROS negative unless indicated in HPI

## 2021-11-22 NOTE — ED PROVIDER NOTE - PROGRESS NOTE DETAILS
FRANCESCO Correa, Attending: spoke to surgery. At this time pending staffing with attending for final dispo. Pt hemodynamically stable. Abx have been ordered. No severe pain. Signed out to Dr. Stuart. Brittany Tsai MD, PGY-2: signout given to surgical team; pt remains clinically stable.

## 2021-11-22 NOTE — ED PROVIDER NOTE - PHYSICAL EXAMINATION
Gen: WDWN, NAD  HEENT: mucous membranes moist  CV: 2+ radial pulses b/l  Resp:no accessory muscle use, no increased work of breathing  GI: Abdomen soft non-distended, NTTP, + purulent drainage from midline incisional wound, no bleeding  MSK: no LE edema, + RLE in cast  Neuro: A&Ox4, following commands, moving all four extremities spontaneously  Psych: appropriate mood

## 2021-11-23 DIAGNOSIS — K65.1 PERITONEAL ABSCESS: ICD-10-CM

## 2021-11-23 DIAGNOSIS — Z98.890 OTHER SPECIFIED POSTPROCEDURAL STATES: Chronic | ICD-10-CM

## 2021-11-23 LAB
ANION GAP SERPL CALC-SCNC: 14 MMOL/L — SIGNIFICANT CHANGE UP (ref 5–17)
APPEARANCE UR: CLEAR — SIGNIFICANT CHANGE UP
BACTERIA # UR AUTO: NEGATIVE — SIGNIFICANT CHANGE UP
BILIRUB UR-MCNC: NEGATIVE — SIGNIFICANT CHANGE UP
BLD GP AB SCN SERPL QL: NEGATIVE — SIGNIFICANT CHANGE UP
BUN SERPL-MCNC: 18 MG/DL — SIGNIFICANT CHANGE UP (ref 7–23)
CALCIUM SERPL-MCNC: 8.3 MG/DL — LOW (ref 8.4–10.5)
CHLORIDE SERPL-SCNC: 96 MMOL/L — SIGNIFICANT CHANGE UP (ref 96–108)
CO2 SERPL-SCNC: 21 MMOL/L — LOW (ref 22–31)
COLOR SPEC: SIGNIFICANT CHANGE UP
COVID-19 NUCLEOCAPSID GAM AB INTERP: NEGATIVE — SIGNIFICANT CHANGE UP
COVID-19 NUCLEOCAPSID TOTAL GAM ANTIBODY RESULT: 0.07 INDEX — SIGNIFICANT CHANGE UP
COVID-19 SPIKE DOMAIN AB INTERP: POSITIVE
COVID-19 SPIKE DOMAIN ANTIBODY RESULT: >250 U/ML — HIGH
CREAT ?TM UR-MCNC: 103 MG/DL — SIGNIFICANT CHANGE UP
CREAT SERPL-MCNC: 1.46 MG/DL — HIGH (ref 0.5–1.3)
DIFF PNL FLD: ABNORMAL
EPI CELLS # UR: 0 /HPF — SIGNIFICANT CHANGE UP
GLUCOSE SERPL-MCNC: 105 MG/DL — HIGH (ref 70–99)
GLUCOSE UR QL: NEGATIVE — SIGNIFICANT CHANGE UP
HCT VFR BLD CALC: 28.4 % — LOW (ref 39–50)
HGB BLD-MCNC: 9.5 G/DL — LOW (ref 13–17)
HYALINE CASTS # UR AUTO: 0 /LPF — SIGNIFICANT CHANGE UP (ref 0–2)
KETONES UR-MCNC: NEGATIVE — SIGNIFICANT CHANGE UP
LACTATE SERPL-SCNC: 0.9 MMOL/L — SIGNIFICANT CHANGE UP (ref 0.7–2)
LEUKOCYTE ESTERASE UR-ACNC: NEGATIVE — SIGNIFICANT CHANGE UP
MAGNESIUM SERPL-MCNC: 2.1 MG/DL — SIGNIFICANT CHANGE UP (ref 1.6–2.6)
MCHC RBC-ENTMCNC: 28.5 PG — SIGNIFICANT CHANGE UP (ref 27–34)
MCHC RBC-ENTMCNC: 33.5 GM/DL — SIGNIFICANT CHANGE UP (ref 32–36)
MCV RBC AUTO: 85.3 FL — SIGNIFICANT CHANGE UP (ref 80–100)
NITRITE UR-MCNC: NEGATIVE — SIGNIFICANT CHANGE UP
NRBC # BLD: 0 /100 WBCS — SIGNIFICANT CHANGE UP (ref 0–0)
OSMOLALITY SERPL: 271 MOSMOL/KG — LOW (ref 275–300)
OSMOLALITY UR: 479 MOS/KG — SIGNIFICANT CHANGE UP (ref 300–900)
PH UR: 6 — SIGNIFICANT CHANGE UP (ref 5–8)
PHOSPHATE SERPL-MCNC: 3.9 MG/DL — SIGNIFICANT CHANGE UP (ref 2.5–4.5)
PLATELET # BLD AUTO: 520 K/UL — HIGH (ref 150–400)
POTASSIUM SERPL-MCNC: 4 MMOL/L — SIGNIFICANT CHANGE UP (ref 3.5–5.3)
POTASSIUM SERPL-SCNC: 4 MMOL/L — SIGNIFICANT CHANGE UP (ref 3.5–5.3)
PROT UR-MCNC: ABNORMAL
RBC # BLD: 3.33 M/UL — LOW (ref 4.2–5.8)
RBC # FLD: 13 % — SIGNIFICANT CHANGE UP (ref 10.3–14.5)
RBC CASTS # UR COMP ASSIST: 7 /HPF — HIGH (ref 0–4)
RH IG SCN BLD-IMP: POSITIVE — SIGNIFICANT CHANGE UP
SARS-COV-2 IGG+IGM SERPL QL IA: 0.07 INDEX — SIGNIFICANT CHANGE UP
SARS-COV-2 IGG+IGM SERPL QL IA: >250 U/ML — HIGH
SARS-COV-2 IGG+IGM SERPL QL IA: NEGATIVE — SIGNIFICANT CHANGE UP
SARS-COV-2 IGG+IGM SERPL QL IA: POSITIVE
SARS-COV-2 RNA SPEC QL NAA+PROBE: SIGNIFICANT CHANGE UP
SODIUM SERPL-SCNC: 131 MMOL/L — LOW (ref 135–145)
SODIUM UR-SCNC: 49 MMOL/L — SIGNIFICANT CHANGE UP
SP GR SPEC: 1.04 — HIGH (ref 1.01–1.02)
UROBILINOGEN FLD QL: NEGATIVE — SIGNIFICANT CHANGE UP
UUN UR-MCNC: 689 MG/DL — SIGNIFICANT CHANGE UP
WBC # BLD: 13.95 K/UL — HIGH (ref 3.8–10.5)
WBC # FLD AUTO: 13.95 K/UL — HIGH (ref 3.8–10.5)
WBC UR QL: 2 /HPF — SIGNIFICANT CHANGE UP (ref 0–5)

## 2021-11-23 PROCEDURE — 71045 X-RAY EXAM CHEST 1 VIEW: CPT | Mod: 26

## 2021-11-23 RX ORDER — ERTAPENEM SODIUM 1 G/1
1000 INJECTION, POWDER, LYOPHILIZED, FOR SOLUTION INTRAMUSCULAR; INTRAVENOUS EVERY 24 HOURS
Refills: 0 | Status: DISCONTINUED | OUTPATIENT
Start: 2021-11-23 | End: 2021-11-28

## 2021-11-23 RX ORDER — SODIUM CHLORIDE 9 MG/ML
1000 INJECTION, SOLUTION INTRAVENOUS
Refills: 0 | Status: DISCONTINUED | OUTPATIENT
Start: 2021-11-23 | End: 2021-11-25

## 2021-11-23 RX ORDER — ACETAMINOPHEN 500 MG
650 TABLET ORAL ONCE
Refills: 0 | Status: COMPLETED | OUTPATIENT
Start: 2021-11-23 | End: 2021-11-23

## 2021-11-23 RX ORDER — ACETAMINOPHEN 500 MG
1000 TABLET ORAL EVERY 6 HOURS
Refills: 0 | Status: COMPLETED | OUTPATIENT
Start: 2021-11-23 | End: 2021-11-25

## 2021-11-23 RX ORDER — IBUPROFEN 200 MG
600 TABLET ORAL ONCE
Refills: 0 | Status: DISCONTINUED | OUTPATIENT
Start: 2021-11-23 | End: 2021-11-23

## 2021-11-23 RX ORDER — ENOXAPARIN SODIUM 100 MG/ML
40 INJECTION SUBCUTANEOUS EVERY 24 HOURS
Refills: 0 | Status: DISCONTINUED | OUTPATIENT
Start: 2021-11-23 | End: 2021-11-23

## 2021-11-23 RX ADMIN — Medication 400 MILLIGRAM(S): at 03:25

## 2021-11-23 RX ADMIN — PIPERACILLIN AND TAZOBACTAM 3.38 GRAM(S): 4; .5 INJECTION, POWDER, LYOPHILIZED, FOR SOLUTION INTRAVENOUS at 01:48

## 2021-11-23 RX ADMIN — ERTAPENEM SODIUM 120 MILLIGRAM(S): 1 INJECTION, POWDER, LYOPHILIZED, FOR SOLUTION INTRAMUSCULAR; INTRAVENOUS at 06:54

## 2021-11-23 RX ADMIN — Medication 1000 MILLIGRAM(S): at 15:14

## 2021-11-23 RX ADMIN — SODIUM CHLORIDE 120 MILLILITER(S): 9 INJECTION, SOLUTION INTRAVENOUS at 05:39

## 2021-11-23 RX ADMIN — Medication 650 MILLIGRAM(S): at 22:27

## 2021-11-23 RX ADMIN — Medication 1000 MILLIGRAM(S): at 02:17

## 2021-11-23 RX ADMIN — Medication 1000 MILLIGRAM(S): at 05:55

## 2021-11-23 RX ADMIN — Medication 650 MILLIGRAM(S): at 21:27

## 2021-11-23 RX ADMIN — Medication 400 MILLIGRAM(S): at 11:54

## 2021-11-23 RX ADMIN — SODIUM CHLORIDE 1000 MILLILITER(S): 9 INJECTION, SOLUTION INTRAVENOUS at 03:51

## 2021-11-23 RX ADMIN — Medication 650 MILLIGRAM(S): at 01:48

## 2021-11-23 NOTE — H&P ADULT - NSHPLABSRESULTS_GEN_ALL_CORE
----------  LABORATORY DATA:  ----------                        10.7   14.65 )-----------( 615      ( 22 Nov 2021 18:44 )             33.2               11-22    133<L>  |  95<L>  |  19  ----------------------------<  120<H>  3.6   |  22  |  1.44<H>    Ca    8.8      22 Nov 2021 18:44    TPro  7.5  /  Alb  3.2<L>  /  TBili  0.8  /  DBili  x   /  AST  31  /  ALT  43  /  AlkPhos  166<H>  11-22    LIVER FUNCTIONS - ( 22 Nov 2021 18:44 )  Alb: 3.2 g/dL / Pro: 7.5 g/dL / ALK PHOS: 166 U/L / ALT: 43 U/L / AST: 31 U/L / GGT: x             < from: CT Abdomen and Pelvis w/ Oral Cont and w/ IV Cont (11.22.21 @ 19:51) >    FINDINGS:  LOWER CHEST: Calcified granuloma in the right middle lobe.    LIVER: Within normal limits.  BILE DUCTS: Normal caliber.  GALLBLADDER: Within normal limits.  SPLEEN: Within normal limits.  PANCREAS: Within normal limits.  ADRENALS: Within normal limits.  KIDNEYS/URETERS: Mild left hydroureteronephrosis to the level of the mid ureter, where there is a loculated fluid collection (series 602 image 48). There is a left delayed nephrogram. No right hydronephrosis. Left renal cyst. Bilateral subcentimeter hypodensities that are too small to characterize, but without changefrom 11/11/2021.    BLADDER: Within normal limits.  REPRODUCTIVE ORGANS: Prostate is normal in size.    BOWEL: Enteric contrast reaches the distal small bowel. No bowel obstruction. Surgical clip along the transverse colon. Normal appendix.  PERITONEUM: Multiple loculated and rim enhancing abdominopelvic fluid collections, many of which contain foci of air, with examples as below:  *  Multiloculated collection located within the pelvis, spanning along the superior aspect of the bladder dome and extending posteriorly to abut the rectum, measures 13.8 x 8.0 x 5.2 cm (series 2 image 102).  *  A collection more superiorly measures 3.7 x 7.2 x 4.8 cm (series 2 image 89). It is this collection that is likely compressing the left ureter to cause hydronephroureter.  *  Collection medial to the ascending colon measuring 3.3 x 4.7 x 3.5 cm (series 2 image 67).  *  Collection abutting loops of jejunum in the left upper quadrant measuring 2.6 x 2.3 x 3.5 cm (series 2 image 43).    Multiple additionalloculated collections measuring up to 4 cm are present.    VESSELS: Within normal limits.  RETROPERITONEUM/LYMPH NODES: Scattered small mesenteric lymph nodes are likely reactive.  ABDOMINAL WALL: Postsurgical changes in the anterior abdominal wall with overlying skin staples in the midline. Trace fluid and air along the incision site, which is nonspecific and potentially postsurgical change, without well-defined drainable fluid collection. Small fat-containing umbilical hernia.  BONES: Degenerative changes of the spine. Transitional lumbosacral vertebral anatomy.    IMPRESSION:  Multiple loculated abdominopelvic fluid collections, the largest located in the pelvis measuring up to 13.8 cm, concerning for abscesses. Several of the collections contain air, potentially postsurgical or related to airforming bacteria. Residual bowel injury is not excluded, though no discrete site of bowel perforation is identified, noting that enteric contrast reaches up to the distal small bowel.    Left hydronephroureter, likely secondary to external compression of the distal left ureter by a pelvic collection.    < end of copied text >

## 2021-11-23 NOTE — ED ADULT NURSE REASSESSMENT NOTE - NS ED NURSE REASSESS COMMENT FT1
pt reassessed, stated pain and fever of 102.5 on vitals, PRN medication given, other vitals WNL, family at the bedside. Pt assessed while primary RN on break, ODALYS miller covering break, Will make primary RN aware.

## 2021-11-23 NOTE — H&P ADULT - NSHPPHYSICALEXAM_GEN_ALL_CORE
Vital Signs Last 24 Hrs  T(C): 38.2 (23 Nov 2021 01:30), Max: 40 (22 Nov 2021 22:30)  T(F): 100.7 (23 Nov 2021 01:30), Max: 104 (22 Nov 2021 22:30)  HR: 91 (23 Nov 2021 01:30) (91 - 103)  BP: 127/67 (23 Nov 2021 01:30) (116/53 - 144/83)  BP(mean): 99 (22 Nov 2021 22:30) (99 - 99)  RR: 20 (23 Nov 2021 01:30) (18 - 20)  SpO2: 99% (23 Nov 2021 01:30) (95% - 100%)    General: well developed, well nourished, NAD  Neuro: alert and oriented, no focal deficits, moves all extremities spontaneously  HEENT: NCAT, EOMI, anicteric, mucosa moist  Respiratory: airway patent, respirations unlabored  CVS: regular rate and rhythm  Abdomen: soft, minimal incisional tenderness, incision with staples intact, few areas with openings where eduardo formerly were, probed and fascia found to be intact  Extremities: no edema, sensation and movement grossly intact  Skin: warm, dry, appropriate color
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
## 2021-11-23 NOTE — H&P ADULT - NSICDXPASTSURGICALHX_GEN_ALL_CORE_FT
PAST SURGICAL HISTORY:  History of laparotomy 11/11  Abdominal washout, repair of transverse colotomy, enterotomy secondary to trauma

## 2021-11-23 NOTE — ED ADULT NURSE REASSESSMENT NOTE - NS ED NURSE REASSESS COMMENT FT1
Report received from ODALYS KHOURY . Pt AAOx4, NAD, resp nonlabored, skin warm/dry, resting comfortably in bed. Pt denies headache, dizziness, chest pain, palpitations, SOB, abd pain, n/v/d, urinary symptoms, fevers, chills, weakness at this time. Pt awaiting for bed  . Safety maintained with call bell within reach. Report received from ODALYS KHOURY . Pt AAOx4, NAD, resp nonlabored, skin warm/dry, resting comfortably in bed. Pt denies headache, dizziness, chest pain, palpitations, SOB, abd pain, n/v/d, urinary symptoms, fevers, chills, weakness at this time. Pt awaiting for bed  . Safety maintained with call bell within reach. W/ LR running and ertapenem 1000mg

## 2021-11-24 ENCOUNTER — APPOINTMENT (OUTPATIENT)
Dept: ORTHOPEDIC SURGERY | Facility: CLINIC | Age: 51
End: 2021-11-24

## 2021-11-24 DIAGNOSIS — R10.9 UNSPECIFIED ABDOMINAL PAIN: ICD-10-CM

## 2021-11-24 LAB
ANION GAP SERPL CALC-SCNC: 13 MMOL/L — SIGNIFICANT CHANGE UP (ref 5–17)
APTT BLD: 40.3 SEC — HIGH (ref 27.5–35.5)
BUN SERPL-MCNC: 17 MG/DL — SIGNIFICANT CHANGE UP (ref 7–23)
CALCIUM SERPL-MCNC: 8.1 MG/DL — LOW (ref 8.4–10.5)
CHLORIDE SERPL-SCNC: 97 MMOL/L — SIGNIFICANT CHANGE UP (ref 96–108)
CO2 SERPL-SCNC: 22 MMOL/L — SIGNIFICANT CHANGE UP (ref 22–31)
CREAT SERPL-MCNC: 1.14 MG/DL — SIGNIFICANT CHANGE UP (ref 0.5–1.3)
GLUCOSE SERPL-MCNC: 115 MG/DL — HIGH (ref 70–99)
HCT VFR BLD CALC: 25.6 % — LOW (ref 39–50)
HGB BLD-MCNC: 8.6 G/DL — LOW (ref 13–17)
INR BLD: 1.36 RATIO — HIGH (ref 0.88–1.16)
MAGNESIUM SERPL-MCNC: 1.9 MG/DL — SIGNIFICANT CHANGE UP (ref 1.6–2.6)
MCHC RBC-ENTMCNC: 28.6 PG — SIGNIFICANT CHANGE UP (ref 27–34)
MCHC RBC-ENTMCNC: 33.6 GM/DL — SIGNIFICANT CHANGE UP (ref 32–36)
MCV RBC AUTO: 85 FL — SIGNIFICANT CHANGE UP (ref 80–100)
NRBC # BLD: 0 /100 WBCS — SIGNIFICANT CHANGE UP (ref 0–0)
PHOSPHATE SERPL-MCNC: 2.8 MG/DL — SIGNIFICANT CHANGE UP (ref 2.5–4.5)
PLATELET # BLD AUTO: 531 K/UL — HIGH (ref 150–400)
POTASSIUM SERPL-MCNC: 3.4 MMOL/L — LOW (ref 3.5–5.3)
POTASSIUM SERPL-SCNC: 3.4 MMOL/L — LOW (ref 3.5–5.3)
PROTHROM AB SERPL-ACNC: 16.1 SEC — HIGH (ref 10.6–13.6)
RBC # BLD: 3.01 M/UL — LOW (ref 4.2–5.8)
RBC # FLD: 13 % — SIGNIFICANT CHANGE UP (ref 10.3–14.5)
SODIUM SERPL-SCNC: 132 MMOL/L — LOW (ref 135–145)
WBC # BLD: 12.48 K/UL — HIGH (ref 3.8–10.5)
WBC # FLD AUTO: 12.48 K/UL — HIGH (ref 3.8–10.5)

## 2021-11-24 PROCEDURE — 99254 IP/OBS CNSLTJ NEW/EST MOD 60: CPT

## 2021-11-24 PROCEDURE — 49406 IMAGE CATH FLUID PERI/RETRO: CPT

## 2021-11-24 RX ORDER — ACETYLCYSTEINE 200 MG/ML
4 VIAL (ML) MISCELLANEOUS THREE TIMES A DAY
Refills: 0 | Status: DISCONTINUED | OUTPATIENT
Start: 2021-11-24 | End: 2021-11-28

## 2021-11-24 RX ORDER — INFLUENZA VIRUS VACCINE 15; 15; 15; 15 UG/.5ML; UG/.5ML; UG/.5ML; UG/.5ML
0.5 SUSPENSION INTRAMUSCULAR ONCE
Refills: 0 | Status: DISCONTINUED | OUTPATIENT
Start: 2021-11-24 | End: 2021-11-28

## 2021-11-24 RX ORDER — MAGNESIUM SULFATE 500 MG/ML
2 VIAL (ML) INJECTION ONCE
Refills: 0 | Status: COMPLETED | OUTPATIENT
Start: 2021-11-24 | End: 2021-11-24

## 2021-11-24 RX ORDER — ENOXAPARIN SODIUM 100 MG/ML
40 INJECTION SUBCUTANEOUS EVERY 24 HOURS
Refills: 0 | Status: DISCONTINUED | OUTPATIENT
Start: 2021-11-24 | End: 2021-11-28

## 2021-11-24 RX ORDER — POTASSIUM CHLORIDE 20 MEQ
10 PACKET (EA) ORAL
Refills: 0 | Status: COMPLETED | OUTPATIENT
Start: 2021-11-24 | End: 2021-11-24

## 2021-11-24 RX ADMIN — Medication 100 MILLIEQUIVALENT(S): at 12:30

## 2021-11-24 RX ADMIN — SODIUM CHLORIDE 120 MILLILITER(S): 9 INJECTION, SOLUTION INTRAVENOUS at 04:06

## 2021-11-24 RX ADMIN — Medication 100 MILLIEQUIVALENT(S): at 20:02

## 2021-11-24 RX ADMIN — Medication 100 MILLIEQUIVALENT(S): at 08:48

## 2021-11-24 RX ADMIN — Medication 4 MILLILITER(S): at 23:04

## 2021-11-24 RX ADMIN — Medication 50 GRAM(S): at 08:48

## 2021-11-24 RX ADMIN — ERTAPENEM SODIUM 120 MILLIGRAM(S): 1 INJECTION, POWDER, LYOPHILIZED, FOR SOLUTION INTRAMUSCULAR; INTRAVENOUS at 05:15

## 2021-11-24 RX ADMIN — SODIUM CHLORIDE 120 MILLILITER(S): 9 INJECTION, SOLUTION INTRAVENOUS at 19:05

## 2021-11-24 RX ADMIN — Medication 400 MILLIGRAM(S): at 04:06

## 2021-11-24 RX ADMIN — Medication 1000 MILLIGRAM(S): at 04:06

## 2021-11-24 NOTE — PROCEDURE NOTE - PROCEDURE FINDINGS AND DETAILS
Pelvic abscess identified on CT. Accessed using 18g needle. A 10.2 drainage catheter was placed under CT guidance. 260cc of pus manually aspirated. Sample sent for culture.
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
## 2021-11-24 NOTE — PROCEDURE NOTE - PLAN
- follow up results of culture   - irrigate drainage catheter as per orders   - monitor output.   - antibiotics and remainder of care as per primary team

## 2021-11-24 NOTE — PROGRESS NOTE ADULT - SUBJECTIVE AND OBJECTIVE BOX
SURGERY PROGRESS NOTE  Hospital Day #1      SUBJECTIVE  Pt seen and examined at bedside. No complaints. Pain controlled. Denies N/V. Tolerating diet.   No acute events overnight.         OBJECTIVE:    PHYSICAL EXAM   General Appearance: Appears well, NAD  Resp: Patent airway, non-labored breathing  CV: RRR  Abdomen: Soft, Nontender, Nondistended    Vital Signs Last 24 Hrs  T(C): 37.7 (2021 12:31), Max: 38.5 (2021 20:48)  T(F): 99.8 (2021 12:31), Max: 101.3 (2021 20:48)  HR: 91 (2021 12:31) (82 - 102)  BP: 137/76 (2021 12:31) (123/64 - 138/73)  RR: 16 (2021 12:31) (16 - 18)  SpO2: 96% (2021 12:31) (96% - 99%)    I's & O's    21 @ 07:01  -  21 @ 07:00  --------------------------------------------------------  IN:    IV PiggyBack: 50 mL    Lactated Ringers: 360 mL    Oral Fluid: 600 mL  Total IN: 1010 mL    OUT:  Total OUT: 0 mL    Total NET: 1010 mL      MEDICATIONS:  ·	ANTIBIOTICS: ertapenem  IVPB 1000 milliGRAM(s)      LAB/STUDIES:                        8.6    12.48 )-----------( 531      ( 2021 07:13 )             25.6     132<L>  |  97  |  17  ----------------------------<  115<H>  3.4<L>   |  22  |  1.14    Ca    8.1<L>      2021 07:11  Phos  2.8     11-24  Mg     1.9     -24    TPro  7.5  /  Alb  3.2<L>  /  TBili  0.8  /  DBili  x   /  AST  31  /  ALT  43  /  AlkPhos  166<H>  11-22    PT/INR - ( 2021 07:15 )   PT: 16.1 sec;   INR: 1.36 ratio    PTT - ( 2021 07:15 )  PTT:40.3 sec    LIVER FUNCTIONS - ( 2021 18:44 )  Alb: 3.2 g/dL / Pro: 7.5 g/dL / ALK PHOS: 166 U/L / ALT: 43 U/L / AST: 31 U/L / GGT: x           Urinalysis Basic - ( 2021 05:03 )    Color: Light Yellow / Appearance: Clear / S.038 / pH: x  Gluc: x / Ketone: Negative  / Bili: Negative / Urobili: Negative   Blood: x / Protein: 30 mg/dL / Nitrite: Negative   Leuk Esterase: Negative / RBC: 7 /hpf / WBC 2 /HPF   Sq Epi: x / Non Sq Epi: 0 /hpf / Bacteria: Negative        MICRO/PATH      Culture - Blood (collected 2021 01:29)  Source: .Blood Blood-Peripheral  Preliminary Report (2021 02:01):    No growth to date.    Culture - Blood (collected 2021 21:50)  Source: .Blood Blood-Peripheral  Preliminary Report (2021 22:02):    No growth to date.
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
## 2021-11-25 LAB
ANION GAP SERPL CALC-SCNC: 14 MMOL/L — SIGNIFICANT CHANGE UP (ref 5–17)
BUN SERPL-MCNC: 14 MG/DL — SIGNIFICANT CHANGE UP (ref 7–23)
C DIFF GDH STL QL: NEGATIVE — SIGNIFICANT CHANGE UP
C DIFF GDH STL QL: SIGNIFICANT CHANGE UP
CALCIUM SERPL-MCNC: 8.3 MG/DL — LOW (ref 8.4–10.5)
CHLORIDE SERPL-SCNC: 99 MMOL/L — SIGNIFICANT CHANGE UP (ref 96–108)
CO2 SERPL-SCNC: 24 MMOL/L — SIGNIFICANT CHANGE UP (ref 22–31)
CREAT SERPL-MCNC: 0.89 MG/DL — SIGNIFICANT CHANGE UP (ref 0.5–1.3)
GLUCOSE SERPL-MCNC: 98 MG/DL — SIGNIFICANT CHANGE UP (ref 70–99)
HCT VFR BLD CALC: 30.4 % — LOW (ref 39–50)
HGB BLD-MCNC: 9.9 G/DL — LOW (ref 13–17)
MAGNESIUM SERPL-MCNC: 2 MG/DL — SIGNIFICANT CHANGE UP (ref 1.6–2.6)
MCHC RBC-ENTMCNC: 28 PG — SIGNIFICANT CHANGE UP (ref 27–34)
MCHC RBC-ENTMCNC: 32.6 GM/DL — SIGNIFICANT CHANGE UP (ref 32–36)
MCV RBC AUTO: 86.1 FL — SIGNIFICANT CHANGE UP (ref 80–100)
NRBC # BLD: 0 /100 WBCS — SIGNIFICANT CHANGE UP (ref 0–0)
PHOSPHATE SERPL-MCNC: 3.3 MG/DL — SIGNIFICANT CHANGE UP (ref 2.5–4.5)
PLATELET # BLD AUTO: 568 K/UL — HIGH (ref 150–400)
POTASSIUM SERPL-MCNC: 3.7 MMOL/L — SIGNIFICANT CHANGE UP (ref 3.5–5.3)
POTASSIUM SERPL-SCNC: 3.7 MMOL/L — SIGNIFICANT CHANGE UP (ref 3.5–5.3)
RBC # BLD: 3.53 M/UL — LOW (ref 4.2–5.8)
RBC # FLD: 12.9 % — SIGNIFICANT CHANGE UP (ref 10.3–14.5)
SODIUM SERPL-SCNC: 137 MMOL/L — SIGNIFICANT CHANGE UP (ref 135–145)
WBC # BLD: 11.53 K/UL — HIGH (ref 3.8–10.5)
WBC # FLD AUTO: 11.53 K/UL — HIGH (ref 3.8–10.5)

## 2021-11-25 PROCEDURE — 99232 SBSQ HOSP IP/OBS MODERATE 35: CPT

## 2021-11-25 RX ORDER — ACETAMINOPHEN 500 MG
975 TABLET ORAL EVERY 6 HOURS
Refills: 0 | Status: DISCONTINUED | OUTPATIENT
Start: 2021-11-25 | End: 2021-11-28

## 2021-11-25 RX ORDER — LACTOBACILLUS ACIDOPHILUS 100MM CELL
1 CAPSULE ORAL
Refills: 0 | Status: DISCONTINUED | OUTPATIENT
Start: 2021-11-25 | End: 2021-11-28

## 2021-11-25 RX ORDER — SODIUM CHLORIDE 9 MG/ML
1000 INJECTION, SOLUTION INTRAVENOUS
Refills: 0 | Status: DISCONTINUED | OUTPATIENT
Start: 2021-11-25 | End: 2021-11-26

## 2021-11-25 RX ORDER — ACETAMINOPHEN 500 MG
1000 TABLET ORAL ONCE
Refills: 0 | Status: COMPLETED | OUTPATIENT
Start: 2021-11-25 | End: 2021-11-25

## 2021-11-25 RX ORDER — OXYCODONE HYDROCHLORIDE 5 MG/1
5 TABLET ORAL EVERY 6 HOURS
Refills: 0 | Status: DISCONTINUED | OUTPATIENT
Start: 2021-11-25 | End: 2021-11-26

## 2021-11-25 RX ADMIN — ERTAPENEM SODIUM 120 MILLIGRAM(S): 1 INJECTION, POWDER, LYOPHILIZED, FOR SOLUTION INTRAMUSCULAR; INTRAVENOUS at 05:45

## 2021-11-25 RX ADMIN — Medication 100 MILLIGRAM(S): at 12:12

## 2021-11-25 RX ADMIN — Medication 400 MILLIGRAM(S): at 14:28

## 2021-11-25 RX ADMIN — Medication 975 MILLIGRAM(S): at 23:02

## 2021-11-25 RX ADMIN — Medication 975 MILLIGRAM(S): at 23:32

## 2021-11-25 RX ADMIN — Medication 1000 MILLIGRAM(S): at 14:34

## 2021-11-25 RX ADMIN — Medication 1000 MILLIGRAM(S): at 00:13

## 2021-11-25 RX ADMIN — Medication 975 MILLIGRAM(S): at 17:40

## 2021-11-25 RX ADMIN — Medication 400 MILLIGRAM(S): at 01:37

## 2021-11-25 RX ADMIN — Medication 4 MILLILITER(S): at 05:45

## 2021-11-25 RX ADMIN — Medication 975 MILLIGRAM(S): at 17:39

## 2021-11-25 RX ADMIN — Medication 1 TABLET(S): at 17:39

## 2021-11-25 RX ADMIN — ENOXAPARIN SODIUM 40 MILLIGRAM(S): 100 INJECTION SUBCUTANEOUS at 05:46
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
## 2021-11-25 NOTE — PROGRESS NOTE ADULT - SUBJECTIVE AND OBJECTIVE BOX
SURGERY PROGRESS NOTE  Hospital Day #2      SUBJECTIVE  Pt seen and examined at bedside. No complaints. Pain controlled. Denies N/V. Tolerating diet.   No acute events overnight.         OBJECTIVE:    PHYSICAL EXAM   General Appearance: Appears well, NAD  Resp: Patent airway, non-labored breathing  CV: RRR  Abdomen: Soft, Nontender, Nondistended    Vital Signs Last 24 Hrs  T(C): 37.1 (25 Nov 2021 02:04), Max: 38.2 (24 Nov 2021 03:48)  T(F): 98.8 (25 Nov 2021 02:04), Max: 100.8 (24 Nov 2021 03:48)  HR: 95 (25 Nov 2021 01:23) (69 - 95)  BP: 135/72 (25 Nov 2021 01:23) (96/52 - 139/80)  BP(mean): 80 (24 Nov 2021 19:00) (71 - 80)  RR: 18 (25 Nov 2021 01:23) (14 - 19)  SpO2: 99% (25 Nov 2021 01:23) (94% - 99%)      23 Nov 2021 07:01  -  24 Nov 2021 07:00  --------------------------------------------------------  IN:    IV PiggyBack: 50 mL    Lactated Ringers: 360 mL    Oral Fluid: 600 mL  Total IN: 1010 mL    OUT:  Total OUT: 0 mL    Total NET: 1010 mL      24 Nov 2021 07:01  -  25 Nov 2021 02:17  --------------------------------------------------------  IN:    Lactated Ringers: 240 mL  Total IN: 240 mL    OUT:    Bulb (mL): 115 mL    Oral Fluid: 0 mL    Voided (mL): 350 mL    Voided (mL): 1100 mL  Total OUT: 1565 mL    Total NET: -1325 mL        LABS:  cret                        8.6    12.48 )-----------( 531      ( 24 Nov 2021 07:13 )             25.6     11-24    132<L>  |  97  |  17  ----------------------------<  115<H>  3.4<L>   |  22  |  1.14    Ca    8.1<L>      24 Nov 2021 07:11  Phos  2.8     11-24  Mg     1.9     11-24      PT/INR - ( 24 Nov 2021 07:15 )   PT: 16.1 sec;   INR: 1.36 ratio         PTT - ( 24 Nov 2021 07:15 )  PTT:40.3 sec         SURGERY PROGRESS NOTE  Hospital Day #2      SUBJECTIVE  Pt seen and examined at bedside. No complaints. Pain controlled. Denies N/V. Tolerating diet. He states that his primary complaint is his cough. We had added a mucomyst regimen. He is symptomatically free from fevers after tylenol administration. He is s/p IR aspiration and drainage catheter insertion.       PHYSICAL EXAM   General Appearance: Appears well, NAD  Resp: Patent airway, non-labored breathing  CV: RRR  Abdomen: Soft, Nontender, Nondistended, drainage catheter in place    Vital Signs Last 24 Hrs  T(C): 37.1 (25 Nov 2021 02:04), Max: 38.2 (24 Nov 2021 03:48)  T(F): 98.8 (25 Nov 2021 02:04), Max: 100.8 (24 Nov 2021 03:48)  HR: 95 (25 Nov 2021 01:23) (69 - 95)  BP: 135/72 (25 Nov 2021 01:23) (96/52 - 139/80)  BP(mean): 80 (24 Nov 2021 19:00) (71 - 80)  RR: 18 (25 Nov 2021 01:23) (14 - 19)  SpO2: 99% (25 Nov 2021 01:23) (94% - 99%)    23 Nov 2021 07:01  -  24 Nov 2021 07:00  --------------------------------------------------------  IN:    IV PiggyBack: 50 mL    Lactated Ringers: 360 mL    Oral Fluid: 600 mL  Total IN: 1010 mL  OUT:  Total OUT: 0 mL  Total NET: 1010 mL    24 Nov 2021 07:01  -  25 Nov 2021 02:17  --------------------------------------------------------  IN:    Lactated Ringers: 240 mL  Total IN: 240 mL  OUT:    Bulb (mL): 115 mL    Oral Fluid: 0 mL    Voided (mL): 350 mL    Voided (mL): 1100 mL  Total OUT: 1565 mL  Total NET: -1325 mL    LABS:                    8.6    12.48 )-----------( 531      ( 24 Nov 2021 07:13 )             25.6   132<L>  |  97  |  17  ----------------------------<  115<H>  3.4<L>   |  22  |  1.14  Ca    8.1<L>      24 Nov 2021 07:11  Phos  2.8     11-24  Mg     1.9     11-24  PT/INR - ( 24 Nov 2021 07:15 )   PT: 16.1 sec;   INR: 1.36 ratio     PTT - ( 24 Nov 2021 07:15 )  PTT:40.3 sec SURGERY PROGRESS NOTE  Hospital Day #2      SUBJECTIVE  Pt seen and examined at bedside. No complaints. Pain controlled. Denies N/V. Tolerating diet. He states that his primary complaint is his cough. We had added a mucomyst regimen. He is symptomatically free from fevers after tylenol administration. He is s/p IR aspiration and drainage catheter insertion. Staples removed bedside, wound packed.       PHYSICAL EXAM   General Appearance: Appears well, NAD  Resp: Patent airway, non-labored breathing  CV: RRR  Abdomen: Soft, Nontender, Nondistended, drainage catheter in place with purulent material. Incision healing two areas with pus draining packed     Vital Signs Last 24 Hrs  T(C): 37.1 (25 Nov 2021 02:04), Max: 38.2 (24 Nov 2021 03:48)  T(F): 98.8 (25 Nov 2021 02:04), Max: 100.8 (24 Nov 2021 03:48)  HR: 95 (25 Nov 2021 01:23) (69 - 95)  BP: 135/72 (25 Nov 2021 01:23) (96/52 - 139/80)  BP(mean): 80 (24 Nov 2021 19:00) (71 - 80)  RR: 18 (25 Nov 2021 01:23) (14 - 19)  SpO2: 99% (25 Nov 2021 01:23) (94% - 99%)    23 Nov 2021 07:01  -  24 Nov 2021 07:00  --------------------------------------------------------  IN:    IV PiggyBack: 50 mL    Lactated Ringers: 360 mL    Oral Fluid: 600 mL  Total IN: 1010 mL  OUT:  Total OUT: 0 mL  Total NET: 1010 mL    24 Nov 2021 07:01  -  25 Nov 2021 02:17  --------------------------------------------------------  IN:    Lactated Ringers: 240 mL  Total IN: 240 mL  OUT:    Bulb (mL): 115 mL    Oral Fluid: 0 mL    Voided (mL): 350 mL    Voided (mL): 1100 mL  Total OUT: 1565 mL  Total NET: -1325 mL    LABS:                    8.6    12.48 )-----------( 531      ( 24 Nov 2021 07:13 )             25.6   132<L>  |  97  |  17  ----------------------------<  115<H>  3.4<L>   |  22  |  1.14  Ca    8.1<L>      24 Nov 2021 07:11  Phos  2.8     11-24  Mg     1.9     11-24  PT/INR - ( 24 Nov 2021 07:15 )   PT: 16.1 sec;   INR: 1.36 ratio     PTT - ( 24 Nov 2021 07:15 )  PTT:40.3 sec
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
## 2021-11-26 ENCOUNTER — TRANSCRIPTION ENCOUNTER (OUTPATIENT)
Age: 51
End: 2021-11-26

## 2021-11-26 LAB
-  AMIKACIN: SIGNIFICANT CHANGE UP
-  AMOXICILLIN/CLAVULANIC ACID: SIGNIFICANT CHANGE UP
-  AMPICILLIN/SULBACTAM: SIGNIFICANT CHANGE UP
-  AMPICILLIN: SIGNIFICANT CHANGE UP
-  AZTREONAM: SIGNIFICANT CHANGE UP
-  CEFAZOLIN: SIGNIFICANT CHANGE UP
-  CEFEPIME: SIGNIFICANT CHANGE UP
-  CEFOXITIN: SIGNIFICANT CHANGE UP
-  CEFTRIAXONE: SIGNIFICANT CHANGE UP
-  CIPROFLOXACIN: SIGNIFICANT CHANGE UP
-  ERTAPENEM: SIGNIFICANT CHANGE UP
-  GENTAMICIN: SIGNIFICANT CHANGE UP
-  IMIPENEM: SIGNIFICANT CHANGE UP
-  LEVOFLOXACIN: SIGNIFICANT CHANGE UP
-  MEROPENEM: SIGNIFICANT CHANGE UP
-  PIPERACILLIN/TAZOBACTAM: SIGNIFICANT CHANGE UP
-  TOBRAMYCIN: SIGNIFICANT CHANGE UP
-  TRIMETHOPRIM/SULFAMETHOXAZOLE: SIGNIFICANT CHANGE UP
ANION GAP SERPL CALC-SCNC: 15 MMOL/L — SIGNIFICANT CHANGE UP (ref 5–17)
BUN SERPL-MCNC: 9 MG/DL — SIGNIFICANT CHANGE UP (ref 7–23)
CALCIUM SERPL-MCNC: 8.3 MG/DL — LOW (ref 8.4–10.5)
CHLORIDE SERPL-SCNC: 94 MMOL/L — LOW (ref 96–108)
CO2 SERPL-SCNC: 24 MMOL/L — SIGNIFICANT CHANGE UP (ref 22–31)
CREAT SERPL-MCNC: 0.7 MG/DL — SIGNIFICANT CHANGE UP (ref 0.5–1.3)
CULTURE RESULTS: SIGNIFICANT CHANGE UP
GLUCOSE SERPL-MCNC: 98 MG/DL — SIGNIFICANT CHANGE UP (ref 70–99)
HCT VFR BLD CALC: 28.9 % — LOW (ref 39–50)
HGB BLD-MCNC: 9.7 G/DL — LOW (ref 13–17)
MAGNESIUM SERPL-MCNC: 1.6 MG/DL — SIGNIFICANT CHANGE UP (ref 1.6–2.6)
MCHC RBC-ENTMCNC: 28.6 PG — SIGNIFICANT CHANGE UP (ref 27–34)
MCHC RBC-ENTMCNC: 33.6 GM/DL — SIGNIFICANT CHANGE UP (ref 32–36)
MCV RBC AUTO: 85.3 FL — SIGNIFICANT CHANGE UP (ref 80–100)
METHOD TYPE: SIGNIFICANT CHANGE UP
NRBC # BLD: 0 /100 WBCS — SIGNIFICANT CHANGE UP (ref 0–0)
ORGANISM # SPEC MICROSCOPIC CNT: SIGNIFICANT CHANGE UP
ORGANISM # SPEC MICROSCOPIC CNT: SIGNIFICANT CHANGE UP
PHOSPHATE SERPL-MCNC: 3.2 MG/DL — SIGNIFICANT CHANGE UP (ref 2.5–4.5)
PLATELET # BLD AUTO: 530 K/UL — HIGH (ref 150–400)
POTASSIUM SERPL-MCNC: 3.2 MMOL/L — LOW (ref 3.5–5.3)
POTASSIUM SERPL-SCNC: 3.2 MMOL/L — LOW (ref 3.5–5.3)
RBC # BLD: 3.39 M/UL — LOW (ref 4.2–5.8)
RBC # FLD: 12.5 % — SIGNIFICANT CHANGE UP (ref 10.3–14.5)
SODIUM SERPL-SCNC: 133 MMOL/L — LOW (ref 135–145)
SPECIMEN SOURCE: SIGNIFICANT CHANGE UP
WBC # BLD: 9.01 K/UL — SIGNIFICANT CHANGE UP (ref 3.8–10.5)
WBC # FLD AUTO: 9.01 K/UL — SIGNIFICANT CHANGE UP (ref 3.8–10.5)

## 2021-11-26 PROCEDURE — 99231 SBSQ HOSP IP/OBS SF/LOW 25: CPT

## 2021-11-26 PROCEDURE — 99232 SBSQ HOSP IP/OBS MODERATE 35: CPT

## 2021-11-26 RX ORDER — POTASSIUM CHLORIDE 20 MEQ
40 PACKET (EA) ORAL ONCE
Refills: 0 | Status: COMPLETED | OUTPATIENT
Start: 2021-11-26 | End: 2021-11-26

## 2021-11-26 RX ORDER — MAGNESIUM SULFATE 500 MG/ML
2 VIAL (ML) INJECTION ONCE
Refills: 0 | Status: COMPLETED | OUTPATIENT
Start: 2021-11-26 | End: 2021-11-26

## 2021-11-26 RX ORDER — OXYCODONE HYDROCHLORIDE 5 MG/1
1 TABLET ORAL
Qty: 12 | Refills: 0
Start: 2021-11-26 | End: 2021-11-28

## 2021-11-26 RX ADMIN — ERTAPENEM SODIUM 120 MILLIGRAM(S): 1 INJECTION, POWDER, LYOPHILIZED, FOR SOLUTION INTRAMUSCULAR; INTRAVENOUS at 05:16

## 2021-11-26 RX ADMIN — Medication 100 MILLIGRAM(S): at 18:01

## 2021-11-26 RX ADMIN — Medication 40 MILLIEQUIVALENT(S): at 09:57

## 2021-11-26 RX ADMIN — Medication 975 MILLIGRAM(S): at 17:58

## 2021-11-26 RX ADMIN — Medication 50 GRAM(S): at 09:56

## 2021-11-26 RX ADMIN — SODIUM CHLORIDE 75 MILLILITER(S): 9 INJECTION, SOLUTION INTRAVENOUS at 05:16

## 2021-11-26 RX ADMIN — Medication 975 MILLIGRAM(S): at 18:28

## 2021-11-26 RX ADMIN — Medication 1 TABLET(S): at 10:00

## 2021-11-26 RX ADMIN — Medication 1 TABLET(S): at 17:58

## 2021-11-26 RX ADMIN — Medication 975 MILLIGRAM(S): at 11:44

## 2021-11-26 RX ADMIN — Medication 975 MILLIGRAM(S): at 12:14

## 2021-11-26 RX ADMIN — Medication 100 MILLIGRAM(S): at 05:20

## 2021-11-26 RX ADMIN — Medication 975 MILLIGRAM(S): at 23:17

## 2021-11-26 RX ADMIN — Medication 975 MILLIGRAM(S): at 05:46

## 2021-11-26 RX ADMIN — Medication 975 MILLIGRAM(S): at 05:16

## 2021-11-26 RX ADMIN — ENOXAPARIN SODIUM 40 MILLIGRAM(S): 100 INJECTION SUBCUTANEOUS at 05:16

## 2021-11-26 NOTE — DISCHARGE NOTE PROVIDER - CARE PROVIDERS DIRECT ADDRESSES
[email or web address]

## 2021-11-26 NOTE — DISCHARGE NOTE PROVIDER - PROVIDER TOKENS
PROVIDER:[TOKEN:[[id number]]] PROVIDER:[TOKEN:[[id number]]],PROVIDER:[TOKEN:[[id number]],FOLLOWUP:[1 week]]

## 2021-11-26 NOTE — DISCHARGE NOTE PROVIDER - NSDCFUSCHEDAPPT_GEN_ALL_CORE_FT
ANGEL LUCAS ; 11/29/2021 ; [facility] TRAUMASUR [address] [facility]  ANGEL LUCAS ; 12/06/2021 ; [facility] OrthoSurg [address] [facility]
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
## 2021-11-26 NOTE — DISCHARGE NOTE PROVIDER - NSDCFUADDAPPT_GEN_ALL_CORE_FT
Patient should follow up with Dr Yenny Kelly in the Infectious Diseases Office at [address] [place] [address] in 1-2 weeks time.  Office contact number is [phone]  If the patient is d/c home with drainage catheter, pt can make an appointment with IR by calling the IR booking office at [phone]; recommend IR follow in 7-10days or when the out put is less than 10cc/24hrs period for tube evaluation.  Pt should continue same drainage catheter care as an outpatient.

## 2021-11-26 NOTE — PROGRESS NOTE ADULT - SUBJECTIVE AND OBJECTIVE BOX
SURGERY PROGRESS NOTE  Hospital Day #3      SUBJECTIVE  Pt seen and examined at bedside. No complaints. Pain controlled. Denies N/V. Tolerating diet. He states that his primary complaint is his cough. We had added a mucomyst regimen. He is symptomatically free from fevers after tylenol administration. He is s/p IR aspiration and drainage catheter insertion. Staples removed bedside, wound packed.       PHYSICAL EXAM   General Appearance: Appears well, NAD  Resp: Patent airway, non-labored breathing  CV: RRR  Abdomen: Soft, Nontender, Nondistended, drainage catheter in place with purulent material. Incision healing two areas with pus draining packed   ---    Vital Signs Last 24 Hrs  T(C): 37.4 (26 Nov 2021 00:00), Max: 38.5 (25 Nov 2021 13:48)  T(F): 99.3 (26 Nov 2021 00:00), Max: 101.3 (25 Nov 2021 13:48)  HR: 72 (26 Nov 2021 00:00) (66 - 95)  BP: 129/75 (26 Nov 2021 00:00) (114/66 - 147/79)  BP(mean): --  RR: 18 (26 Nov 2021 00:00) (18 - 18)  SpO2: 94% (26 Nov 2021 00:00) (94% - 99%)      24 Nov 2021 07:01  -  25 Nov 2021 07:00  --------------------------------------------------------  IN:    IV PiggyBack: 50 mL    Lactated Ringers: 1680 mL  Total IN: 1730 mL    OUT:    Bulb (mL): 170 mL    Oral Fluid: 0 mL    Voided (mL): 350 mL    Voided (mL): 2350 mL  Total OUT: 2870 mL    Total NET: -1140 mL      25 Nov 2021 07:01  -  26 Nov 2021 00:18  --------------------------------------------------------  IN:    dextrose 5% + sodium chloride 0.45%: 75 mL    IV PiggyBack: 100 mL    Lactated Ringers: 1320 mL    Oral Fluid: 420 mL  Total IN: 1915 mL    OUT:    Bulb (mL): 48 mL    Voided (mL): 3125 mL  Total OUT: 3173 mL    Total NET: -1258 mL        LABS:  cret                        9.9    11.53 )-----------( 568      ( 25 Nov 2021 07:16 )             30.4     11-25    137  |  99  |  14  ----------------------------<  98  3.7   |  24  |  0.89    Ca    8.3<L>      25 Nov 2021 07:12  Phos  3.3     11-25  Mg     2.0     11-25      PT/INR - ( 24 Nov 2021 07:15 )   PT: 16.1 sec;   INR: 1.36 ratio         PTT - ( 24 Nov 2021 07:15 )  PTT:40.3 sec         SURGERY PROGRESS NOTE  Hospital Day #3      SUBJECTIVE  Pt seen and examined at bedside. No complaints. Pain controlled. Denies N/V. Tolerating diet.     PHYSICAL EXAM   General Appearance: Appears well, NAD  Resp: Patent airway, non-labored breathing  CV: RRR  Abdomen: Soft, Nontender, Nondistended, drainage catheter in place with purulent discharge      Vital Signs Last 24 Hrs  T(C): 37.4 (26 Nov 2021 00:00), Max: 38.5 (25 Nov 2021 13:48)  T(F): 99.3 (26 Nov 2021 00:00), Max: 101.3 (25 Nov 2021 13:48)  HR: 72 (26 Nov 2021 00:00) (66 - 95)  BP: 129/75 (26 Nov 2021 00:00) (114/66 - 147/79)    RR: 18 (26 Nov 2021 00:00) (18 - 18)  SpO2: 94% (26 Nov 2021 00:00) (94% - 99%)      24 Nov 2021 07:01  -  25 Nov 2021 07:00  --------------------------------------------------------  IN:    IV PiggyBack: 50 mL    Lactated Ringers: 1680 mL  Total IN: 1730 mL    OUT:    Bulb (mL): 170 mL    Oral Fluid: 0 mL    Voided (mL): 350 mL    Voided (mL): 2350 mL  Total OUT: 2870 mL    Total NET: -1140 mL      25 Nov 2021 07:01  -  26 Nov 2021 00:18  --------------------------------------------------------  IN:    dextrose 5% + sodium chloride 0.45%: 75 mL    IV PiggyBack: 100 mL    Lactated Ringers: 1320 mL    Oral Fluid: 420 mL  Total IN: 1915 mL    OUT:    Bulb (mL): 48 mL    Voided (mL): 3125 mL  Total OUT: 3173 mL    Total NET: -1258 mL        LABS:  cret                        9.9    11.53 )-----------( 568      ( 25 Nov 2021 07:16 )             30.4     11-25    137  |  99  |  14  ----------------------------<  98  3.7   |  24  |  0.89    Ca    8.3<L>      25 Nov 2021 07:12  Phos  3.3     11-25  Mg     2.0     11-25      PT/INR - ( 24 Nov 2021 07:15 )   PT: 16.1 sec;   INR: 1.36 ratio         PTT - ( 24 Nov 2021 07:15 )  PTT:40.3 sec

## 2021-11-26 NOTE — DISCHARGE NOTE PROVIDER - CARE PROVIDER_API CALL
Zheng Costa)  Surgery; Surgical Critical Care  [address] [facility], [address]  [place] [address]  Phone: [phone]  Fax: [phone]  Follow Up Time:    Zheng Costa)  Surgery; Surgical Critical Care  [address] [facility], [address]  [place] [address]  Phone: [phone]  Fax: [phone]  Follow Up Time:     Jaret Hanna  INTERVENTIONAL RADIOLOGY AND DIAGNOSTIC RADIOLOGY  [address] [place] [address]  Phone: [phone]  Fax: [phone]  Follow Up Time: 1 week

## 2021-11-26 NOTE — DISCHARGE NOTE PROVIDER - NSDCMRMEDTOKEN_GEN_ALL_CORE_FT
acetaminophen 325 mg oral tablet: 3 tab(s) orally every 8 hours  guaiFENesin 100 mg/5 mL oral liquid: 5 milliliter(s) orally every 6 hours, As needed, Cough  ibuprofen 400 mg oral tablet: 1 tab(s) orally every 8 hours  oxyCODONE 5 mg oral tablet: 1 tab(s) orally every 6 hours, As needed, Severe Pain (7 - 10) MDD:4 tabs  pantoprazole 40 mg oral delayed release tablet: 1 tab(s) orally once a day (before a meal)   acetaminophen 325 mg oral tablet: 3 tab(s) orally every 8 hours  acetaminophen 325 mg oral tablet: 3 tab(s) orally every 6 hours  guaiFENesin 100 mg/5 mL oral liquid: 5 milliliter(s) orally every 6 hours, As needed, Cough  ibuprofen 400 mg oral tablet: 1 tab(s) orally every 8 hours  levoFLOXacin 500 mg oral tablet: 1 tab(s) orally every 24 hours  metroNIDAZOLE 500 mg oral tablet: 1 tab(s) orally every 12 hours  oxyCODONE 5 mg oral tablet: 1 tab(s) orally every 6 hours, As needed, Severe Pain (7 - 10) MDD:4 tabs  pantoprazole 40 mg oral delayed release tablet: 1 tab(s) orally once a day (before a meal)

## 2021-11-26 NOTE — DISCHARGE NOTE PROVIDER - NSFOLLOWUPCLINICS_GEN_ALL_ED_FT
[facility] - Infectious Disease  Infectious Disease  [address] [facility], [place] [address]  Phone: [phone]  Fax:   Established Patient  Follow Up Time: 1 week

## 2021-11-26 NOTE — PROGRESS NOTE ADULT - SUBJECTIVE AND OBJECTIVE BOX
Patient is a 51y old  Male who presents with a chief complaint of abdominal pain    f/u abscess    PAST MEDICAL & SURGICAL HISTORY:  History of laparotomy   Abdominal washout, repair of transverse colotomy, enterotomy secondary to trauma    Allergies  No Known Allergies    ANTIMICROBIALS:  piperacillin/tazobactam IVPB (11/22 x1)  vancomycin  IVPB (11/22 x1)    active:  ertapenem  IVPB 1000 every 24 hours (-)    MEDICATIONS  (STANDING):  acetaminophen     Tablet .. 975 every 6 hours  acetylcysteine 10%  Inhalation 4 three times a day  enoxaparin Injectable 40 every 24 hours  influenza   Vaccine 0.5 once    Vital Signs Last 24 Hrs  T(F): 97.5 (21 @ 09:32), Max: 101.3 (21 @ 13:48)  HR: 68 (21 @ 09:32)  BP: 128/80 (21 @ 09:32)  RR: 18 (21 @ 09:32)  SpO2: 97% (21 @ 09:32) (94% - 98%)    PHYSICAL EXAM:                          9.7    9.01  )-----------( 530      ( 2021 07:14 )             28.9     133  |  94  |  9   ----------------------------<  98  3.2   |  24  |  0.70  Ca    8.3      2021 07:17Phos  3.2     Mg     1.6         Urinalysis Basic - ( 2021 05:03 )  Color: Light Yellow / Appearance: Clear / S.038 / pH: x  Gluc: x / Ketone: Negative  / Bili: Negative / Urobili: Negative   Blood: x / Protein: 30 mg/dL / Nitrite: Negative   Leuk Esterase: Negative / RBC: 7 /hpf / WBC 2 /HPF   Sq Epi: x / Non Sq Epi: 0 /hpf / Bacteria: Negative    MICROBIOLOGY:  Culture - Abscess with Gram Stain (collected 21 @ 22:15)  Source: .Abscess Transgluteal abscess  Preliminary Report (21 @ 18:54):    Moderate Escherichia coli    Culture - Blood (collected 21 @ 01:29)  Source: .Blood Blood-Peripheral  Preliminary Report (21 @ 02:01):    No growth to date.    Culture - Blood (collected 21 @ 21:50)  Source: .Blood Blood-Peripheral  Preliminary Report (21 @ 22:02):    No growth to date.    RADIOLOGY:  imaging below personally reviewed and agree with findings    CT Abdomen and Pelvis w/ Oral Cont and w/ IV Cont (21 @ 19:51) >  KIDNEYS/URETERS: Mild left hydroureteronephrosis to the level of the mid ureter, where there is a loculated fluid collection (series 602 image 48). There is a left delayed nephrogram. No right hydronephrosis. Left renal cyst. Bilateral subcentimeter hypodensities that are too small to characterize, but without changefrom 2021.    BLADDER: Within normal limits.  REPRODUCTIVE ORGANS: Prostate is normal in size.  BOWEL: Enteric contrast reaches the distal small bowel. No bowel obstruction. Surgical clip along the transverse colon. Normal appendix.  PERITONEUM: Multiple loculated and rim enhancing abdominopelvic fluid collections, many of which contain foci of air, with examples as below:  *  Multiloculated collection located within the pelvis, spanning along the superior aspect of the bladder dome and extending posteriorly to abut the rectum, measures 13.8 x 8.0 x 5.2 cm (series 2 image 102).  *  A collection more superiorly measures 3.7 x 7.2 x 4.8 cm (series 2 image 89). It is this collection that is likely compressing the left ureter to cause hydronephroureter.  *  Collection medial to the ascending colon measuring 3.3 x 4.7 x 3.5 cm (series 2 image 67).  *  Collection abutting loops of jejunum in the left upper quadrant measuring 2.6 x 2.3 x 3.5 cm (series 2 image 43).  Multiple additional loculated collections measuring up to 4 cm are present.  VESSELS: Within normal limits.  RETROPERITONEUM/LYMPH NODES: Scattered small mesenteric lymph nodes are likely reactive.  ABDOMINAL WALL: Postsurgical changes in the anterior abdominal wall with overlying skin staples in the midline. Trace fluid and air along the incision site, which is nonspecific and potentially postsurgical change, without well-defined drainable fluid collection. Small fat-containing umbilical hernia.  BONES: Degenerative changes of the spine. Transitional lumbosacral vertebral anatomy.  IMPRESSION:  Multiple loculated abdominopelvic fluid collections, the largest located in the pelvis measuring up to 13.8 cm, concerning for abscesses. Several of the collections contain air, potentially postsurgical or related to air forming bacteria. Residual bowel injury is not excluded, though no discrete site of bowel perforation is identified, noting that enteric contrast reaches up to the distal small bowel.  Left hydronephroureter, likely secondary to external compression of the distal left ureter by a pelvic collection.      Patient is a 51y old  Male who presents with a chief complaint of abdominal pain    f/u abscess    PAST MEDICAL & SURGICAL HISTORY:  History of laparotomy   Abdominal washout, repair of transverse colotomy, enterotomy secondary to trauma    Allergies  No Known Allergies    ANTIMICROBIALS:  piperacillin/tazobactam IVPB (11/22 x1)  vancomycin  IVPB (11/22 x1)    active:  ertapenem  IVPB 1000 every 24 hours (-)    MEDICATIONS  (STANDING):  acetaminophen     Tablet .. 975 every 6 hours  acetylcysteine 10%  Inhalation 4 three times a day  enoxaparin Injectable 40 every 24 hours  influenza   Vaccine 0.5 once    Vital Signs Last 24 Hrs  T(F): 97.5 (21 @ 09:32), Max: 101.3 (21 @ 13:48)  HR: 68 (21 @ 09:32)  BP: 128/80 (21 @ 09:32)  RR: 18 (21 @ 09:32)  SpO2: 97% (21 @ 09:32) (94% - 98%)    PHYSICAL EXAM:  Constitutional: non-toxic  HEAD/EYES: anicteric  ENT:  supple  Cardiovascular:   normal S1, S2, no murmur  Respiratory:  clear BS bilaterally  GI:  soft, tender, ex-lap with staples and drainage of serous d/c distally, OLI w purulent drainage  :  no butterfield  Musculoskeletal:  no synovitis  Neurologic: awake and alert, normal strength, no focal findings  Skin:  no rash, no erythema, no phlebitis  Heme/Onc: no lymphadenopathy   Psychiatric:  awake, alert, appropriate mood                        9.7    9.01  )-----------( 530      ( 2021 07:14 )             28.9     133  |  94  |  9   ----------------------------<  98  3.2   |  24  |  0.70  Ca    8.3      2021 07:17Phos  3.2     Mg     1.6         Urinalysis Basic - ( 2021 05:03 )  Color: Light Yellow / Appearance: Clear / S.038 / pH: x  Gluc: x / Ketone: Negative  / Bili: Negative / Urobili: Negative   Blood: x / Protein: 30 mg/dL / Nitrite: Negative   Leuk Esterase: Negative / RBC: 7 /hpf / WBC 2 /HPF   Sq Epi: x / Non Sq Epi: 0 /hpf / Bacteria: Negative    MICROBIOLOGY:  Culture - Abscess with Gram Stain (collected 21 @ 22:15)  Source: .Abscess Transgluteal abscess  Preliminary Report (21 @ 18:54):    Moderate Escherichia coli    Culture - Blood (collected 21 @ 01:29)  Source: .Blood Blood-Peripheral  Preliminary Report (21 @ 02:01):    No growth to date.    Culture - Blood (collected 21 @ 21:50)  Source: .Blood Blood-Peripheral  Preliminary Report (21 @ 22:02):    No growth to date.    RADIOLOGY:  imaging below personally reviewed and agree with findings    CT Abdomen and Pelvis w/ Oral Cont and w/ IV Cont (21 @ 19:51) >  KIDNEYS/URETERS: Mild left hydroureteronephrosis to the level of the mid ureter, where there is a loculated fluid collection (series 602 image 48). There is a left delayed nephrogram. No right hydronephrosis. Left renal cyst. Bilateral subcentimeter hypodensities that are too small to characterize, but without changefrom 2021.    BLADDER: Within normal limits.  REPRODUCTIVE ORGANS: Prostate is normal in size.  BOWEL: Enteric contrast reaches the distal small bowel. No bowel obstruction. Surgical clip along the transverse colon. Normal appendix.  PERITONEUM: Multiple loculated and rim enhancing abdominopelvic fluid collections, many of which contain foci of air, with examples as below:  *  Multiloculated collection located within the pelvis, spanning along the superior aspect of the bladder dome and extending posteriorly to abut the rectum, measures 13.8 x 8.0 x 5.2 cm (series 2 image 102).  *  A collection more superiorly measures 3.7 x 7.2 x 4.8 cm (series 2 image 89). It is this collection that is likely compressing the left ureter to cause hydronephroureter.  *  Collection medial to the ascending colon measuring 3.3 x 4.7 x 3.5 cm (series 2 image 67).  *  Collection abutting loops of jejunum in the left upper quadrant measuring 2.6 x 2.3 x 3.5 cm (series 2 image 43).  Multiple additional loculated collections measuring up to 4 cm are present.  VESSELS: Within normal limits.  RETROPERITONEUM/LYMPH NODES: Scattered small mesenteric lymph nodes are likely reactive.  ABDOMINAL WALL: Postsurgical changes in the anterior abdominal wall with overlying skin staples in the midline. Trace fluid and air along the incision site, which is nonspecific and potentially postsurgical change, without well-defined drainable fluid collection. Small fat-containing umbilical hernia.  BONES: Degenerative changes of the spine. Transitional lumbosacral vertebral anatomy.  IMPRESSION:  Multiple loculated abdominopelvic fluid collections, the largest located in the pelvis measuring up to 13.8 cm, concerning for abscesses. Several of the collections contain air, potentially postsurgical or related to air forming bacteria. Residual bowel injury is not excluded, though no discrete site of bowel perforation is identified, noting that enteric contrast reaches up to the distal small bowel.  Left hydronephroureter, likely secondary to external compression of the distal left ureter by a pelvic collection.
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
## 2021-11-26 NOTE — DISCHARGE NOTE PROVIDER - NSDCCPCAREPLAN_GEN_ALL_CORE_FT
PRINCIPAL DISCHARGE DIAGNOSIS  Diagnosis: Abscess of abdominal cavity  Assessment and Plan of Treatment:       SECONDARY DISCHARGE DIAGNOSES  Diagnosis: Sepsis  Assessment and Plan of Treatment:     Diagnosis: Open wound of anterior abdominal wall  Assessment and Plan of Treatment:
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
## 2021-11-27 LAB
ANION GAP SERPL CALC-SCNC: 15 MMOL/L — SIGNIFICANT CHANGE UP (ref 5–17)
BUN SERPL-MCNC: 12 MG/DL — SIGNIFICANT CHANGE UP (ref 7–23)
CALCIUM SERPL-MCNC: 8.5 MG/DL — SIGNIFICANT CHANGE UP (ref 8.4–10.5)
CHLORIDE SERPL-SCNC: 98 MMOL/L — SIGNIFICANT CHANGE UP (ref 96–108)
CO2 SERPL-SCNC: 23 MMOL/L — SIGNIFICANT CHANGE UP (ref 22–31)
CREAT SERPL-MCNC: 0.82 MG/DL — SIGNIFICANT CHANGE UP (ref 0.5–1.3)
CULTURE RESULTS: SIGNIFICANT CHANGE UP
GLUCOSE SERPL-MCNC: 96 MG/DL — SIGNIFICANT CHANGE UP (ref 70–99)
HCT VFR BLD CALC: 30.9 % — LOW (ref 39–50)
HGB BLD-MCNC: 10.1 G/DL — LOW (ref 13–17)
MAGNESIUM SERPL-MCNC: 1.6 MG/DL — SIGNIFICANT CHANGE UP (ref 1.6–2.6)
MCHC RBC-ENTMCNC: 28.4 PG — SIGNIFICANT CHANGE UP (ref 27–34)
MCHC RBC-ENTMCNC: 32.7 GM/DL — SIGNIFICANT CHANGE UP (ref 32–36)
MCV RBC AUTO: 86.8 FL — SIGNIFICANT CHANGE UP (ref 80–100)
NRBC # BLD: 0 /100 WBCS — SIGNIFICANT CHANGE UP (ref 0–0)
PHOSPHATE SERPL-MCNC: 3.3 MG/DL — SIGNIFICANT CHANGE UP (ref 2.5–4.5)
PLATELET # BLD AUTO: 615 K/UL — HIGH (ref 150–400)
POTASSIUM SERPL-MCNC: 4 MMOL/L — SIGNIFICANT CHANGE UP (ref 3.5–5.3)
POTASSIUM SERPL-SCNC: 4 MMOL/L — SIGNIFICANT CHANGE UP (ref 3.5–5.3)
RBC # BLD: 3.56 M/UL — LOW (ref 4.2–5.8)
RBC # FLD: 12.3 % — SIGNIFICANT CHANGE UP (ref 10.3–14.5)
SODIUM SERPL-SCNC: 136 MMOL/L — SIGNIFICANT CHANGE UP (ref 135–145)
SPECIMEN SOURCE: SIGNIFICANT CHANGE UP
WBC # BLD: 9.24 K/UL — SIGNIFICANT CHANGE UP (ref 3.8–10.5)
WBC # FLD AUTO: 9.24 K/UL — SIGNIFICANT CHANGE UP (ref 3.8–10.5)

## 2021-11-27 PROCEDURE — 99232 SBSQ HOSP IP/OBS MODERATE 35: CPT

## 2021-11-27 RX ORDER — MAGNESIUM SULFATE 500 MG/ML
2 VIAL (ML) INJECTION ONCE
Refills: 0 | Status: COMPLETED | OUTPATIENT
Start: 2021-11-27 | End: 2021-11-27

## 2021-11-27 RX ADMIN — Medication 1 TABLET(S): at 09:36

## 2021-11-27 RX ADMIN — Medication 100 MILLIGRAM(S): at 19:56

## 2021-11-27 RX ADMIN — Medication 975 MILLIGRAM(S): at 17:48

## 2021-11-27 RX ADMIN — Medication 975 MILLIGRAM(S): at 11:42

## 2021-11-27 RX ADMIN — Medication 975 MILLIGRAM(S): at 05:47

## 2021-11-27 RX ADMIN — Medication 1 TABLET(S): at 17:18

## 2021-11-27 RX ADMIN — Medication 50 GRAM(S): at 11:40

## 2021-11-27 RX ADMIN — Medication 975 MILLIGRAM(S): at 23:17

## 2021-11-27 RX ADMIN — Medication 975 MILLIGRAM(S): at 17:18

## 2021-11-27 RX ADMIN — Medication 975 MILLIGRAM(S): at 05:07

## 2021-11-27 RX ADMIN — Medication 975 MILLIGRAM(S): at 12:12

## 2021-11-27 RX ADMIN — ENOXAPARIN SODIUM 40 MILLIGRAM(S): 100 INJECTION SUBCUTANEOUS at 05:08

## 2021-11-27 RX ADMIN — ERTAPENEM SODIUM 120 MILLIGRAM(S): 1 INJECTION, POWDER, LYOPHILIZED, FOR SOLUTION INTRAMUSCULAR; INTRAVENOUS at 05:08

## 2021-11-27 RX ADMIN — Medication 100 MILLIGRAM(S): at 11:41

## 2021-11-27 RX ADMIN — Medication 975 MILLIGRAM(S): at 00:43

## 2021-11-27 NOTE — PROGRESS NOTE ADULT - ATTENDING COMMENTS
Patient seen and examined.  Non-toxic appearing  Leukocytosis normalizing  Awaiting culture results, hopefully will be able to discharge home on oral antibiotics soon
anticipate discharge home  antibiotics per infectious disease  continue drain
looks well, feels better after I.R. drainage  explored wound at bedside  minimal drainage from wound,  packed in to separate places through small opening  will monitor fever curve  plan to get infectious disease consultation to optimize antibiotics
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
## 2021-11-27 NOTE — PROGRESS NOTE ADULT - SUBJECTIVE AND OBJECTIVE BOX
SURGERY PROGRESS NOTE  Hospital Day #4      SUBJECTIVE  Pt seen and examined at bedside. No complaints. Pain controlled. Denies N/V. Tolerating diet.     PHYSICAL EXAM   General Appearance: Appears well, NAD  Resp: Patent airway, non-labored breathing  CV: RRR  Abdomen: Soft, Nontender, Nondistended, drainage catheter in place with purulent discharge    Vital Signs Last 24 Hrs  T(C): 37.2 (27 Nov 2021 00:00), Max: 37.2 (26 Nov 2021 04:55)  T(F): 99 (27 Nov 2021 00:00), Max: 99 (26 Nov 2021 04:55)  HR: 79 (27 Nov 2021 00:00) (68 - 84)  BP: 123/75 (27 Nov 2021 00:00) (123/75 - 134/78)  BP(mean): --  RR: 18 (27 Nov 2021 00:00) (18 - 18)  SpO2: 98% (27 Nov 2021 00:00) (96% - 98%)      25 Nov 2021 07:01  -  26 Nov 2021 07:00  --------------------------------------------------------  IN:    dextrose 5% + sodium chloride 0.45%: 975 mL    IV PiggyBack: 50 mL    IV PiggyBack: 100 mL    Lactated Ringers: 1320 mL    Oral Fluid: 420 mL  Total IN: 2865 mL    OUT:    Bulb (mL): 58 mL    Voided (mL): 4500 mL  Total OUT: 4558 mL    Total NET: -1693 mL      26 Nov 2021 07:01  -  27 Nov 2021 00:09  --------------------------------------------------------  IN:    Oral Fluid: 720 mL  Total IN: 720 mL    OUT:    Bulb (mL): 15 mL    Voided (mL): 1400 mL  Total OUT: 1415 mL    Total NET: -695 mL        LABS:  cret                        9.7    9.01  )-----------( 530      ( 26 Nov 2021 07:14 )             28.9     11-26    133<L>  |  94<L>  |  9   ----------------------------<  98  3.2<L>   |  24  |  0.70    Ca    8.3<L>      26 Nov 2021 07:17  Phos  3.2     11-26  Mg     1.6     11-26               SURGERY PROGRESS NOTE  Hospital Day #4      SUBJECTIVE  Pt seen and examined at bedside. No complaints. Pain controlled. Denies N/V. Tolerating diet.     PHYSICAL EXAM   General Appearance: Appears well, NAD  Resp: Patent airway, non-labored breathing  CV: RRR  Abdomen: Soft, Nontender, Nondistended, drainage catheter in place with purulent discharge, midline wound with packing     Vital Signs Last 24 Hrs  T(C): 37.2 (27 Nov 2021 00:00), Max: 37.2 (26 Nov 2021 04:55)  T(F): 99 (27 Nov 2021 00:00), Max: 99 (26 Nov 2021 04:55)  HR: 79 (27 Nov 2021 00:00) (68 - 84)  BP: 123/75 (27 Nov 2021 00:00) (123/75 - 134/78)  BP(mean): --  RR: 18 (27 Nov 2021 00:00) (18 - 18)  SpO2: 98% (27 Nov 2021 00:00) (96% - 98%)      25 Nov 2021 07:01  -  26 Nov 2021 07:00  --------------------------------------------------------  IN:    dextrose 5% + sodium chloride 0.45%: 975 mL    IV PiggyBack: 50 mL    IV PiggyBack: 100 mL    Lactated Ringers: 1320 mL    Oral Fluid: 420 mL  Total IN: 2865 mL    OUT:    Bulb (mL): 58 mL    Voided (mL): 4500 mL  Total OUT: 4558 mL    Total NET: -1693 mL      26 Nov 2021 07:01  -  27 Nov 2021 00:09  --------------------------------------------------------  IN:    Oral Fluid: 720 mL  Total IN: 720 mL    OUT:    Bulb (mL): 15 mL    Voided (mL): 1400 mL  Total OUT: 1415 mL    Total NET: -695 mL        LABS:  cret                        9.7    9.01  )-----------( 530      ( 26 Nov 2021 07:14 )             28.9     11-26    133<L>  |  94<L>  |  9   ----------------------------<  98  3.2<L>   |  24  |  0.70    Ca    8.3<L>      26 Nov 2021 07:17  Phos  3.2     11-26  Mg     1.6     11-26

## 2021-11-27 NOTE — PROGRESS NOTE ADULT - SUBJECTIVE AND OBJECTIVE BOX
Follow Up:  Intraabdominal Abscess    Interval History: afebrile. no acute overnight events.     REVIEW OF SYSTEMS  [  ] ROS unobtainable because:    [ x ] All other systems negative except as noted below    Constitutional:  [ ] fever [ ] chills  [ ] weight loss  [ ] weakness  Skin:  [ ] rash [ ] phlebitis	  Eyes: [ ] icterus [ ] pain  [ ] discharge	  ENMT: [ ] sore throat  [ ] thrush [ ] ulcers [ ] exudates  Respiratory: [ ] dyspnea [ ] hemoptysis [ ] cough [ ] sputum	  Cardiovascular:  [ ] chest pain [ ] palpitations [ ] edema	  Gastrointestinal:  [ ] nausea [ ] vomiting [ ] diarrhea [ ] constipation [ ] pain	  Genitourinary:  [ ] dysuria [ ] frequency [ ] hematuria [ ] discharge [ ] flank pain  [ ] incontinence  Musculoskeletal:  [ ] myalgias [ ] arthralgias [ ] arthritis  [ ] back pain  Neurological:  [ ] headache [ ] seizures  [ ] confusion/altered mental status    Allergies  No Known Allergies        ANTIMICROBIALS:  ertapenem  IVPB 1000 every 24 hours      OTHER MEDS:  MEDICATIONS  (STANDING):  acetaminophen     Tablet .. 975 every 6 hours  acetylcysteine 10%  Inhalation 4 three times a day  enoxaparin Injectable 40 every 24 hours  guaiFENesin Oral Liquid (Sugar-Free) 100 every 6 hours PRN  influenza   Vaccine 0.5 once      Vital Signs Last 24 Hrs  T(C): 36.8 (27 Nov 2021 09:25), Max: 37.2 (27 Nov 2021 00:00)  T(F): 98.3 (27 Nov 2021 09:25), Max: 99 (27 Nov 2021 00:00)  HR: 69 (27 Nov 2021 09:25) (66 - 84)  BP: 120/69 (27 Nov 2021 09:25) (120/69 - 134/78)  BP(mean): --  RR: 18 (27 Nov 2021 09:25) (18 - 18)  SpO2: 97% (27 Nov 2021 09:25) (95% - 98%)    PHYSICAL EXAMINATION:  General: Alert and Awake, NAD  Cardiac: RRR, No M/R/G  Resp: CTAB, No Wh/Rh/Ra  Abdomen: ex-lap with staples and drainage of serous d/c distally, OLI w purulent drainage  MSK: No LE edema. No Calf tenderness  : No butterfield  Skin: No rashes or lesions. Skin is warm and dry to the touch.   Neuro: Alert and Awake. CN 2-12 Grossly intact. Moves all four extremities spontaneously.  Psych: Calm, Pleasant, Cooperative                          10.1   9.24  )-----------( 615      ( 27 Nov 2021 07:10 )             30.9       11-27    136  |  98  |  12  ----------------------------<  96  4.0   |  23  |  0.82    Ca    8.5      27 Nov 2021 07:10  Phos  3.3     11-27  Mg     1.6     11-27    MICROBIOLOGY:    .Abscess Transgluteal abscess  11-24-21   Moderate Escherichia coli  Moderate Bacteroides ovatus group "Susceptibilities not performed"  --  Escherichia coli    .Blood Blood-Peripheral  11-23-21   No growth to date.  --  --      .Blood Blood-Peripheral  11-22-21   No growth to date.  --  --    Clostridium difficile GDH Toxins A&amp;B, EIA:   Negative (11-25-21 @ 15:29)  Clostridium difficile GDH Interpretation: Negative for toxigenic C. Difficile.  This specimen is negative for C.  Difficile glutamate dehydrogenase (GDH) antigen and negative for C.  Difficile Toxins A & B, by EIA.  GDH is a highly sensitive screening  marker for C. Difficile that is produced in large amounts by all C.  Difficile strains, both toxigenic and nontoxigenic.  This assay has not  been validated as a test of cure.  Repeat testing during the same episode  of diarrhea is of limited value and is discouraged.  The results of this  assay should always be interpreted in conjunction with pateint's clinical  history. (11-25-21 @ 15:29)      RADIOLOGY:    <The imaging below has been reviewed and visualized by me independently. Findings as detailed in report below>    < from: CT Abdomen and Pelvis w/ Oral Cont and w/ IV Cont (11.22.21 @ 19:51) >  IMPRESSION:  Multiple loculated abdominopelvic fluid collections, the largest located in the pelvis measuring up to 13.8 cm, concerning for abscesses. Several of the collections contain air, potentially postsurgical or related to airforming bacteria. Residual bowel injury is not excluded, though no discrete site of bowel perforation is identified, noting that enteric contrast reaches up to the distal small bowel.    Left hydronephroureter, likely secondary to external compression of the distal left ureter by a pelvic collection.    < end of copied text >   Follow Up:  Intraabdominal Abscess    Interval History: afebrile. no acute overnight events.     REVIEW OF SYSTEMS  [  ] ROS unobtainable because:    [ x ] All other systems negative except as noted below    Constitutional:  [ ] fever [ ] chills  [ ] weight loss  [ ] weakness  Skin:  [ ] rash [ ] phlebitis	  Eyes: [ ] icterus [ ] pain  [ ] discharge	  ENMT: [ ] sore throat  [ ] thrush [ ] ulcers [ ] exudates  Respiratory: [ ] dyspnea [ ] hemoptysis [ ] cough [ ] sputum	  Cardiovascular:  [ ] chest pain [ ] palpitations [ ] edema	  Gastrointestinal:  [ ] nausea [ ] vomiting [ ] diarrhea [ ] constipation [ ] pain	  Genitourinary:  [ ] dysuria [ ] frequency [ ] hematuria [ ] discharge [ ] flank pain  [ ] incontinence  Musculoskeletal:  [ ] myalgias [ ] arthralgias [ ] arthritis  [ ] back pain  Neurological:  [ ] headache [ ] seizures  [ ] confusion/altered mental status    Allergies  No Known Allergies        ANTIMICROBIALS:  ertapenem  IVPB 1000 every 24 hours      OTHER MEDS:  MEDICATIONS  (STANDING):  acetaminophen     Tablet .. 975 every 6 hours  acetylcysteine 10%  Inhalation 4 three times a day  enoxaparin Injectable 40 every 24 hours  guaiFENesin Oral Liquid (Sugar-Free) 100 every 6 hours PRN  influenza   Vaccine 0.5 once      Vital Signs Last 24 Hrs  T(C): 36.8 (27 Nov 2021 09:25), Max: 37.2 (27 Nov 2021 00:00)  T(F): 98.3 (27 Nov 2021 09:25), Max: 99 (27 Nov 2021 00:00)  HR: 69 (27 Nov 2021 09:25) (66 - 84)  BP: 120/69 (27 Nov 2021 09:25) (120/69 - 134/78)  BP(mean): --  RR: 18 (27 Nov 2021 09:25) (18 - 18)  SpO2: 97% (27 Nov 2021 09:25) (95% - 98%)    PHYSICAL EXAMINATION:  General: Alert and Awake, NAD  Cardiac: RRR, No M/R/G  Resp: CTAB, No Wh/Rh/Ra  Abdomen: NBS, NT/ND, s/p ex-lap with staples and drainage of serous d/c distally, OLI w purulent drainage  MSK: No LE edema. No Calf tenderness  : No butterfield  Skin: No rashes or lesions. Skin is warm and dry to the touch.   Neuro: Alert and Awake. CN 2-12 Grossly intact. Moves all four extremities spontaneously.  Psych: Calm, Pleasant, Cooperative                          10.1   9.24  )-----------( 615      ( 27 Nov 2021 07:10 )             30.9       11-27    136  |  98  |  12  ----------------------------<  96  4.0   |  23  |  0.82    Ca    8.5      27 Nov 2021 07:10  Phos  3.3     11-27  Mg     1.6     11-27    MICROBIOLOGY:    .Abscess Transgluteal abscess  11-24-21   Moderate Escherichia coli  Moderate Bacteroides ovatus group "Susceptibilities not performed"  --  Escherichia coli    .Blood Blood-Peripheral  11-23-21   No growth to date.  --  --      .Blood Blood-Peripheral  11-22-21   No growth to date.  --  --    Clostridium difficile GDH Toxins A&amp;B, EIA:   Negative (11-25-21 @ 15:29)  Clostridium difficile GDH Interpretation: Negative for toxigenic C. Difficile.  This specimen is negative for C.  Difficile glutamate dehydrogenase (GDH) antigen and negative for C.  Difficile Toxins A & B, by EIA.  GDH is a highly sensitive screening  marker for C. Difficile that is produced in large amounts by all C.  Difficile strains, both toxigenic and nontoxigenic.  This assay has not  been validated as a test of cure.  Repeat testing during the same episode  of diarrhea is of limited value and is discouraged.  The results of this  assay should always be interpreted in conjunction with pateint's clinical  history. (11-25-21 @ 15:29)      RADIOLOGY:    <The imaging below has been reviewed and visualized by me independently. Findings as detailed in report below>    < from: CT Abdomen and Pelvis w/ Oral Cont and w/ IV Cont (11.22.21 @ 19:51) >  IMPRESSION:  Multiple loculated abdominopelvic fluid collections, the largest located in the pelvis measuring up to 13.8 cm, concerning for abscesses. Several of the collections contain air, potentially postsurgical or related to airforming bacteria. Residual bowel injury is not excluded, though no discrete site of bowel perforation is identified, noting that enteric contrast reaches up to the distal small bowel.    Left hydronephroureter, likely secondary to external compression of the distal left ureter by a pelvic collection.    < end of copied text >   Follow Up:  Intraabdominal Abscess    Interval History: Utilized [employer]  ID [id number] afebrile. no acute overnight events.     REVIEW OF SYSTEMS  [  ] ROS unobtainable because:    [ x ] All other systems negative except as noted below    Constitutional:  [ ] fever [ ] chills  [ ] weight loss  [ ] weakness  Skin:  [ ] rash [ ] phlebitis	  Eyes: [ ] icterus [ ] pain  [ ] discharge	  ENMT: [ ] sore throat  [ ] thrush [ ] ulcers [ ] exudates  Respiratory: [ ] dyspnea [ ] hemoptysis [ ] cough [ ] sputum	  Cardiovascular:  [ ] chest pain [ ] palpitations [ ] edema	  Gastrointestinal:  [ ] nausea [ ] vomiting [ ] diarrhea [ ] constipation [ ] pain	  Genitourinary:  [ ] dysuria [ ] frequency [ ] hematuria [ ] discharge [ ] flank pain  [ ] incontinence  Musculoskeletal:  [ ] myalgias [ ] arthralgias [ ] arthritis  [ ] back pain  Neurological:  [ ] headache [ ] seizures  [ ] confusion/altered mental status    Allergies  No Known Allergies        ANTIMICROBIALS:  ertapenem  IVPB 1000 every 24 hours      OTHER MEDS:  MEDICATIONS  (STANDING):  acetaminophen     Tablet .. 975 every 6 hours  acetylcysteine 10%  Inhalation 4 three times a day  enoxaparin Injectable 40 every 24 hours  guaiFENesin Oral Liquid (Sugar-Free) 100 every 6 hours PRN  influenza   Vaccine 0.5 once      Vital Signs Last 24 Hrs  T(C): 36.8 (27 Nov 2021 09:25), Max: 37.2 (27 Nov 2021 00:00)  T(F): 98.3 (27 Nov 2021 09:25), Max: 99 (27 Nov 2021 00:00)  HR: 69 (27 Nov 2021 09:25) (66 - 84)  BP: 120/69 (27 Nov 2021 09:25) (120/69 - 134/78)  BP(mean): --  RR: 18 (27 Nov 2021 09:25) (18 - 18)  SpO2: 97% (27 Nov 2021 09:25) (95% - 98%)    PHYSICAL EXAMINATION:  General: Alert and Awake, NAD  Cardiac: RRR, No M/R/G  Resp: CTAB, No Wh/Rh/Ra  Abdomen: NBS, NT/ND, s/p ex-lap with staples and drainage of serous d/c distally, OLI w purulent drainage  MSK: No LE edema. No Calf tenderness  : No butterfield  Skin: No rashes or lesions. Skin is warm and dry to the touch.   Neuro: Alert and Awake. CN 2-12 Grossly intact. Moves all four extremities spontaneously.  Psych: Calm, Pleasant, Cooperative                          10.1   9.24  )-----------( 615      ( 27 Nov 2021 07:10 )             30.9       11-27    136  |  98  |  12  ----------------------------<  96  4.0   |  23  |  0.82    Ca    8.5      27 Nov 2021 07:10  Phos  3.3     11-27  Mg     1.6     11-27    MICROBIOLOGY:    .Abscess Transgluteal abscess  11-24-21   Moderate Escherichia coli  Moderate Bacteroides ovatus group "Susceptibilities not performed"  --  Escherichia coli    .Blood Blood-Peripheral  11-23-21   No growth to date.  --  --      .Blood Blood-Peripheral  11-22-21   No growth to date.  --  --    Clostridium difficile GDH Toxins A&amp;B, EIA:   Negative (11-25-21 @ 15:29)  Clostridium difficile GDH Interpretation: Negative for toxigenic C. Difficile.  This specimen is negative for C.  Difficile glutamate dehydrogenase (GDH) antigen and negative for C.  Difficile Toxins A & B, by EIA.  GDH is a highly sensitive screening  marker for C. Difficile that is produced in large amounts by all C.  Difficile strains, both toxigenic and nontoxigenic.  This assay has not  been validated as a test of cure.  Repeat testing during the same episode  of diarrhea is of limited value and is discouraged.  The results of this  assay should always be interpreted in conjunction with pateint's clinical  history. (11-25-21 @ 15:29)      RADIOLOGY:    <The imaging below has been reviewed and visualized by me independently. Findings as detailed in report below>    < from: CT Abdomen and Pelvis w/ Oral Cont and w/ IV Cont (11.22.21 @ 19:51) >  IMPRESSION:  Multiple loculated abdominopelvic fluid collections, the largest located in the pelvis measuring up to 13.8 cm, concerning for abscesses. Several of the collections contain air, potentially postsurgical or related to airforming bacteria. Residual bowel injury is not excluded, though no discrete site of bowel perforation is identified, noting that enteric contrast reaches up to the distal small bowel.    Left hydronephroureter, likely secondary to external compression of the distal left ureter by a pelvic collection.    < end of copied text >
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
## 2021-11-28 ENCOUNTER — TRANSCRIPTION ENCOUNTER (OUTPATIENT)
Age: 51
End: 2021-11-28

## 2021-11-28 VITALS
DIASTOLIC BLOOD PRESSURE: 67 MMHG | RESPIRATION RATE: 18 BRPM | TEMPERATURE: 98 F | OXYGEN SATURATION: 97 % | HEART RATE: 77 BPM | SYSTOLIC BLOOD PRESSURE: 123 MMHG

## 2021-11-28 LAB
CULTURE RESULTS: SIGNIFICANT CHANGE UP
SPECIMEN SOURCE: SIGNIFICANT CHANGE UP

## 2021-11-28 PROCEDURE — 83605 ASSAY OF LACTIC ACID: CPT

## 2021-11-28 PROCEDURE — 71045 X-RAY EXAM CHEST 1 VIEW: CPT

## 2021-11-28 PROCEDURE — 80048 BASIC METABOLIC PNL TOTAL CA: CPT

## 2021-11-28 PROCEDURE — 83690 ASSAY OF LIPASE: CPT

## 2021-11-28 PROCEDURE — G1004: CPT

## 2021-11-28 PROCEDURE — U0003: CPT

## 2021-11-28 PROCEDURE — 82803 BLOOD GASES ANY COMBINATION: CPT

## 2021-11-28 PROCEDURE — 83735 ASSAY OF MAGNESIUM: CPT

## 2021-11-28 PROCEDURE — 87186 SC STD MICRODIL/AGAR DIL: CPT

## 2021-11-28 PROCEDURE — 82435 ASSAY OF BLOOD CHLORIDE: CPT

## 2021-11-28 PROCEDURE — 87205 SMEAR GRAM STAIN: CPT

## 2021-11-28 PROCEDURE — 85027 COMPLETE CBC AUTOMATED: CPT

## 2021-11-28 PROCEDURE — 87040 BLOOD CULTURE FOR BACTERIA: CPT

## 2021-11-28 PROCEDURE — 87070 CULTURE OTHR SPECIMN AEROBIC: CPT

## 2021-11-28 PROCEDURE — 87449 NOS EACH ORGANISM AG IA: CPT

## 2021-11-28 PROCEDURE — U0005: CPT

## 2021-11-28 PROCEDURE — 81001 URINALYSIS AUTO W/SCOPE: CPT

## 2021-11-28 PROCEDURE — 86769 SARS-COV-2 COVID-19 ANTIBODY: CPT

## 2021-11-28 PROCEDURE — C1729: CPT

## 2021-11-28 PROCEDURE — 86901 BLOOD TYPING SEROLOGIC RH(D): CPT

## 2021-11-28 PROCEDURE — 82330 ASSAY OF CALCIUM: CPT

## 2021-11-28 PROCEDURE — 82947 ASSAY GLUCOSE BLOOD QUANT: CPT

## 2021-11-28 PROCEDURE — 85014 HEMATOCRIT: CPT

## 2021-11-28 PROCEDURE — 84132 ASSAY OF SERUM POTASSIUM: CPT

## 2021-11-28 PROCEDURE — 74177 CT ABD & PELVIS W/CONTRAST: CPT | Mod: MG

## 2021-11-28 PROCEDURE — 82565 ASSAY OF CREATININE: CPT

## 2021-11-28 PROCEDURE — 96374 THER/PROPH/DIAG INJ IV PUSH: CPT

## 2021-11-28 PROCEDURE — 87324 CLOSTRIDIUM AG IA: CPT

## 2021-11-28 PROCEDURE — 85610 PROTHROMBIN TIME: CPT

## 2021-11-28 PROCEDURE — 84540 ASSAY OF URINE/UREA-N: CPT

## 2021-11-28 PROCEDURE — 85730 THROMBOPLASTIN TIME PARTIAL: CPT

## 2021-11-28 PROCEDURE — 36415 COLL VENOUS BLD VENIPUNCTURE: CPT

## 2021-11-28 PROCEDURE — 82570 ASSAY OF URINE CREATININE: CPT

## 2021-11-28 PROCEDURE — 87077 CULTURE AEROBIC IDENTIFY: CPT

## 2021-11-28 PROCEDURE — 84300 ASSAY OF URINE SODIUM: CPT

## 2021-11-28 PROCEDURE — 94640 AIRWAY INHALATION TREATMENT: CPT

## 2021-11-28 PROCEDURE — 86850 RBC ANTIBODY SCREEN: CPT

## 2021-11-28 PROCEDURE — 83930 ASSAY OF BLOOD OSMOLALITY: CPT

## 2021-11-28 PROCEDURE — 86900 BLOOD TYPING SEROLOGIC ABO: CPT

## 2021-11-28 PROCEDURE — 84100 ASSAY OF PHOSPHORUS: CPT

## 2021-11-28 PROCEDURE — 85018 HEMOGLOBIN: CPT

## 2021-11-28 PROCEDURE — 93005 ELECTROCARDIOGRAM TRACING: CPT

## 2021-11-28 PROCEDURE — 85025 COMPLETE CBC W/AUTO DIFF WBC: CPT

## 2021-11-28 PROCEDURE — 80053 COMPREHEN METABOLIC PANEL: CPT

## 2021-11-28 PROCEDURE — 99285 EMERGENCY DEPT VISIT HI MDM: CPT | Mod: 25

## 2021-11-28 PROCEDURE — 84295 ASSAY OF SERUM SODIUM: CPT

## 2021-11-28 PROCEDURE — 49406 IMAGE CATH FLUID PERI/RETRO: CPT

## 2021-11-28 PROCEDURE — C1769: CPT

## 2021-11-28 PROCEDURE — 83935 ASSAY OF URINE OSMOLALITY: CPT

## 2021-11-28 RX ORDER — METRONIDAZOLE 500 MG
500 TABLET ORAL EVERY 12 HOURS
Refills: 0 | Status: DISCONTINUED | OUTPATIENT
Start: 2021-11-28 | End: 2021-11-28

## 2021-11-28 RX ORDER — ACETAMINOPHEN 500 MG
3 TABLET ORAL
Qty: 0 | Refills: 0 | DISCHARGE
Start: 2021-11-28

## 2021-11-28 RX ORDER — METRONIDAZOLE 500 MG
1 TABLET ORAL
Qty: 28 | Refills: 0
Start: 2021-11-28 | End: 2021-12-11

## 2021-11-28 RX ADMIN — ENOXAPARIN SODIUM 40 MILLIGRAM(S): 100 INJECTION SUBCUTANEOUS at 05:33

## 2021-11-28 RX ADMIN — Medication 975 MILLIGRAM(S): at 06:36

## 2021-11-28 RX ADMIN — ERTAPENEM SODIUM 120 MILLIGRAM(S): 1 INJECTION, POWDER, LYOPHILIZED, FOR SOLUTION INTRAMUSCULAR; INTRAVENOUS at 05:34

## 2021-11-28 RX ADMIN — Medication 1 TABLET(S): at 09:40

## 2021-11-28 RX ADMIN — Medication 975 MILLIGRAM(S): at 12:43

## 2021-11-28 RX ADMIN — Medication 975 MILLIGRAM(S): at 13:32

## 2021-11-28 RX ADMIN — Medication 975 MILLIGRAM(S): at 05:33

## 2021-11-28 RX ADMIN — Medication 975 MILLIGRAM(S): at 00:00

## 2021-11-28 RX ADMIN — Medication 100 MILLIGRAM(S): at 10:31

## 2021-11-28 NOTE — DISCHARGE NOTE NURSING/CASE MANAGEMENT/SOCIAL WORK - PATIENT PORTAL LINK FT
You can access the FollowMyHealth Patient Portal offered by [facility] by registering at the following website: [email or web address]. By joining [employer]’s FollowMyHealth portal, you will also be able to view your health information using other applications (apps) compatible with our system.

## 2021-11-28 NOTE — PROGRESS NOTE ADULT - PROVIDER SPECIALTY LIST ADULT
Infectious Disease
Intervent Radiology
Trauma Surgery
Trauma Surgery
Infectious Disease
Trauma Surgery
Infectious Disease
Trauma Surgery
Trauma Surgery

## 2021-11-28 NOTE — PROGRESS NOTE ADULT - SUBJECTIVE AND OBJECTIVE BOX
Trauma Surgery Progress Note    SUBJECTIVE  No acute events overnight. Pt seen and examined on mornings rounds.    OBJECTIVE  ___________________________________________________  VITAL SIGNS / I&O's   Vital Signs Last 24 Hrs  T(C): 36.7 (28 Nov 2021 01:02), Max: 37.2 (27 Nov 2021 13:21)  T(F): 98 (28 Nov 2021 01:02), Max: 98.9 (27 Nov 2021 13:21)  HR: 66 (28 Nov 2021 01:02) (66 - 84)  BP: 109/70 (28 Nov 2021 01:02) (109/70 - 129/77)  BP(mean): --  RR: 18 (28 Nov 2021 01:02) (18 - 18)  SpO2: 97% (28 Nov 2021 01:02) (97% - 98%)      26 Nov 2021 07:01  -  27 Nov 2021 07:00  --------------------------------------------------------  IN:    Oral Fluid: 720 mL  Total IN: 720 mL    OUT:    Bulb (mL): 25 mL    Voided (mL): 2700 mL  Total OUT: 2725 mL    Total NET: -2005 mL 27 Nov 2021 07:01  -  28 Nov 2021 05:10  --------------------------------------------------------  IN:    Oral Fluid: 820 mL  Total IN: 820 mL    OUT:    Bulb (mL): 25 mL    Voided (mL): 1475 mL  Total OUT: 1500 mL    Total NET: -680 mL        ___________________________________________________  PHYSICAL EXAM    General Appearance: Appears well, NAD  Resp: Patent airway, non-labored breathing  CV: RRR  Abdomen: Soft, Nontender, Nondistended, drainage catheter in place with purulent discharge, midline wound with packing     ___________________________________________________  LABS                        10.1   9.24  )-----------( 615      ( 27 Nov 2021 07:10 )             30.9     27 Nov 2021 07:10    136    |  98     |  12     ----------------------------<  96     4.0     |  23     |  0.82     Ca    8.5        27 Nov 2021 07:10  Phos  3.3       27 Nov 2021 07:10  Mg     1.6       27 Nov 2021 07:10    _________________  MICRO  Recent Cultures:  Specimen Source: .Abscess Transgluteal abscess, 11-24 @ 22:15; Results   Moderate Escherichia coli  Moderate Bacteroides ovatus group "Susceptibilities not performed"; Gram Stain: --; Organism: Escherichia coli  Specimen Source: .Blood Blood-Peripheral, 11-23 @ 01:29; Results   No Growth Final; Gram Stain: --; Organism: --  Specimen Source: .Blood Blood-Peripheral, 11-22 @ 21:50; Results   No Growth Final; Gram Stain: --; Organism: --    ___________________________________________________  MEDICATIONS  (STANDING):  acetaminophen     Tablet .. 975 milliGRAM(s) Oral every 6 hours  acetylcysteine 10%  Inhalation 4 milliLiter(s) Inhalation three times a day  enoxaparin Injectable 40 milliGRAM(s) SubCutaneous every 24 hours  ertapenem  IVPB 1000 milliGRAM(s) IV Intermittent every 24 hours  influenza   Vaccine 0.5 milliLiter(s) IntraMuscular once  lactobacillus acidophilus 1 Tablet(s) Oral two times a day with meals    MEDICATIONS  (PRN):  guaiFENesin Oral Liquid (Sugar-Free) 100 milliGRAM(s) Oral every 6 hours PRN Cough

## 2021-11-28 NOTE — DISCHARGE NOTE NURSING/CASE MANAGEMENT/SOCIAL WORK - NSDCFUADDAPPT_GEN_ALL_CORE_FT
Patient should follow up with Dr Yenny Kelly in the Infectious Diseases Office at [address] [place] [address] in 1-2 weeks time.  Office contact number is [phone]  If the patient is d/c home with drainage catheter, pt can make an appointment with IR by calling the IR booking office at [phone]; recommend IR follow in 7-10days or when the out put is less than 10cc/24hrs period for tube evaluation.  Pt should continue same drainage catheter care as an outpatient.

## 2021-11-29 ENCOUNTER — APPOINTMENT (OUTPATIENT)
Dept: TRAUMA SURGERY | Facility: CLINIC | Age: 51
End: 2021-11-29

## 2021-12-13 ENCOUNTER — APPOINTMENT (OUTPATIENT)
Dept: ORTHOPEDIC SURGERY | Facility: CLINIC | Age: 51
End: 2021-12-13

## 2021-12-21 ENCOUNTER — APPOINTMENT (OUTPATIENT)
Dept: INFECTIOUS DISEASE | Facility: CLINIC | Age: 51
End: 2021-12-21